# Patient Record
Sex: FEMALE | Race: WHITE | Employment: FULL TIME | URBAN - METROPOLITAN AREA
[De-identification: names, ages, dates, MRNs, and addresses within clinical notes are randomized per-mention and may not be internally consistent; named-entity substitution may affect disease eponyms.]

---

## 2017-03-15 ENCOUNTER — OFFICE VISIT (OUTPATIENT)
Dept: FAMILY MEDICINE CLINIC | Age: 57
End: 2017-03-15

## 2017-03-15 DIAGNOSIS — Z13.9 SCREENING: Primary | ICD-10-CM

## 2017-03-15 PROCEDURE — WM1004 WORKMED DRUG COLLECTION: Performed by: NURSE PRACTITIONER

## 2018-03-23 ENCOUNTER — HOSPITAL ENCOUNTER (EMERGENCY)
Age: 58
Discharge: HOME | End: 2018-03-23
Attending: EMERGENCY MEDICINE
Payer: COMMERCIAL

## 2018-03-23 ENCOUNTER — APPOINTMENT (OUTPATIENT)
Dept: CT IMAGING | Age: 58
End: 2018-03-23
Payer: COMMERCIAL

## 2018-03-23 ENCOUNTER — APPOINTMENT (OUTPATIENT)
Dept: GENERAL RADIOLOGY | Age: 58
End: 2018-03-23
Payer: COMMERCIAL

## 2018-03-23 VITALS
DIASTOLIC BLOOD PRESSURE: 61 MMHG | OXYGEN SATURATION: 95 % | RESPIRATION RATE: 16 BRPM | HEART RATE: 91 BPM | HEIGHT: 63 IN | BODY MASS INDEX: 37.76 KG/M2 | SYSTOLIC BLOOD PRESSURE: 155 MMHG | TEMPERATURE: 98.4 F | WEIGHT: 213.1 LBS

## 2018-03-23 DIAGNOSIS — J06.9 VIRAL URI: Primary | ICD-10-CM

## 2018-03-23 LAB
ABSOLUTE EOS #: 0.1 K/UL (ref 0–0.4)
ABSOLUTE IMMATURE GRANULOCYTE: ABNORMAL K/UL (ref 0–0.3)
ABSOLUTE LYMPH #: 2.5 K/UL (ref 1–4.8)
ABSOLUTE MONO #: 0.4 K/UL (ref 0.2–0.8)
ANION GAP SERPL CALCULATED.3IONS-SCNC: 13 MMOL/L (ref 9–17)
BASOPHILS # BLD: 0 % (ref 0–2)
BASOPHILS ABSOLUTE: 0 K/UL (ref 0–0.2)
BUN BLDV-MCNC: 18 MG/DL (ref 6–20)
BUN/CREAT BLD: 23 (ref 9–20)
CALCIUM SERPL-MCNC: 9.3 MG/DL (ref 8.6–10.4)
CHLORIDE BLD-SCNC: 103 MMOL/L (ref 98–107)
CO2: 23 MMOL/L (ref 20–31)
CREAT SERPL-MCNC: 0.79 MG/DL (ref 0.5–0.9)
DIFFERENTIAL TYPE: ABNORMAL
EOSINOPHILS RELATIVE PERCENT: 1 % (ref 1–4)
GFR AFRICAN AMERICAN: >60 ML/MIN
GFR NON-AFRICAN AMERICAN: >60 ML/MIN
GFR SERPL CREATININE-BSD FRML MDRD: ABNORMAL ML/MIN/{1.73_M2}
GFR SERPL CREATININE-BSD FRML MDRD: ABNORMAL ML/MIN/{1.73_M2}
GLUCOSE BLD-MCNC: 96 MG/DL (ref 70–99)
HCT VFR BLD CALC: 43.6 % (ref 36–46)
HEMOGLOBIN: 14.2 G/DL (ref 12–16)
IMMATURE GRANULOCYTES: ABNORMAL %
LACTIC ACID: 1.5 MMOL/L (ref 0.5–2.2)
LYMPHOCYTES # BLD: 22 % (ref 24–44)
MCH RBC QN AUTO: 28.1 PG (ref 26–34)
MCHC RBC AUTO-ENTMCNC: 32.5 G/DL (ref 31–37)
MCV RBC AUTO: 86.2 FL (ref 80–100)
MONOCYTES # BLD: 4 % (ref 1–7)
NRBC AUTOMATED: ABNORMAL PER 100 WBC
PDW BLD-RTO: 15.4 % (ref 11.5–14.5)
PLATELET # BLD: 209 K/UL (ref 130–400)
PLATELET ESTIMATE: ABNORMAL
PMV BLD AUTO: 9 FL (ref 6–12)
POTASSIUM SERPL-SCNC: 4.2 MMOL/L (ref 3.7–5.3)
RBC # BLD: 5.05 M/UL (ref 4–5.2)
RBC # BLD: ABNORMAL 10*6/UL
SEG NEUTROPHILS: 73 % (ref 36–66)
SEGMENTED NEUTROPHILS ABSOLUTE COUNT: 8.4 K/UL (ref 1.8–7.7)
SODIUM BLD-SCNC: 139 MMOL/L (ref 135–144)
WBC # BLD: 11.4 K/UL (ref 3.5–11)
WBC # BLD: ABNORMAL 10*3/UL

## 2018-03-23 PROCEDURE — 71250 CT THORAX DX C-: CPT

## 2018-03-23 PROCEDURE — 99284 EMERGENCY DEPT VISIT MOD MDM: CPT

## 2018-03-23 PROCEDURE — 96375 TX/PRO/DX INJ NEW DRUG ADDON: CPT

## 2018-03-23 PROCEDURE — 6360000002 HC RX W HCPCS: Performed by: NURSE PRACTITIONER

## 2018-03-23 PROCEDURE — 80048 BASIC METABOLIC PNL TOTAL CA: CPT

## 2018-03-23 PROCEDURE — 94150 VITAL CAPACITY TEST: CPT

## 2018-03-23 PROCEDURE — 71046 X-RAY EXAM CHEST 2 VIEWS: CPT

## 2018-03-23 PROCEDURE — 83605 ASSAY OF LACTIC ACID: CPT

## 2018-03-23 PROCEDURE — 94664 DEMO&/EVAL PT USE INHALER: CPT

## 2018-03-23 PROCEDURE — 74176 CT ABD & PELVIS W/O CONTRAST: CPT

## 2018-03-23 PROCEDURE — 85025 COMPLETE CBC W/AUTO DIFF WBC: CPT

## 2018-03-23 PROCEDURE — 94640 AIRWAY INHALATION TREATMENT: CPT

## 2018-03-23 PROCEDURE — 96374 THER/PROPH/DIAG INJ IV PUSH: CPT

## 2018-03-23 RX ORDER — LEVOFLOXACIN 500 MG/1
500 TABLET, FILM COATED ORAL DAILY
COMMUNITY
End: 2018-12-05 | Stop reason: ALTCHOICE

## 2018-03-23 RX ORDER — ECHINACEA PURPUREA EXTRACT 125 MG
1 TABLET ORAL PRN
Qty: 1 BOTTLE | Refills: 0 | Status: SHIPPED | OUTPATIENT
Start: 2018-03-23 | End: 2018-12-05 | Stop reason: ALTCHOICE

## 2018-03-23 RX ORDER — ALBUTEROL SULFATE 90 UG/1
2 AEROSOL, METERED RESPIRATORY (INHALATION)
Status: DISCONTINUED | OUTPATIENT
Start: 2018-03-23 | End: 2018-03-23 | Stop reason: HOSPADM

## 2018-03-23 RX ORDER — METHYLPREDNISOLONE SODIUM SUCCINATE 125 MG/2ML
125 INJECTION, POWDER, LYOPHILIZED, FOR SOLUTION INTRAMUSCULAR; INTRAVENOUS ONCE
Status: COMPLETED | OUTPATIENT
Start: 2018-03-23 | End: 2018-03-23

## 2018-03-23 RX ORDER — KETOROLAC TROMETHAMINE 30 MG/ML
30 INJECTION, SOLUTION INTRAMUSCULAR; INTRAVENOUS ONCE
Status: COMPLETED | OUTPATIENT
Start: 2018-03-23 | End: 2018-03-23

## 2018-03-23 RX ORDER — IPRATROPIUM BROMIDE AND ALBUTEROL SULFATE 2.5; .5 MG/3ML; MG/3ML
1 SOLUTION RESPIRATORY (INHALATION)
Status: DISCONTINUED | OUTPATIENT
Start: 2018-03-23 | End: 2018-03-23 | Stop reason: HOSPADM

## 2018-03-23 RX ORDER — ONDANSETRON 2 MG/ML
4 INJECTION INTRAMUSCULAR; INTRAVENOUS ONCE
Status: COMPLETED | OUTPATIENT
Start: 2018-03-23 | End: 2018-03-23

## 2018-03-23 RX ORDER — ALBUTEROL SULFATE 2.5 MG/3ML
5 SOLUTION RESPIRATORY (INHALATION)
Status: DISCONTINUED | OUTPATIENT
Start: 2018-03-23 | End: 2018-03-23 | Stop reason: HOSPADM

## 2018-03-23 RX ORDER — MORPHINE SULFATE 4 MG/ML
4 INJECTION, SOLUTION INTRAMUSCULAR; INTRAVENOUS ONCE
Status: COMPLETED | OUTPATIENT
Start: 2018-03-23 | End: 2018-03-23

## 2018-03-23 RX ORDER — IPRATROPIUM BROMIDE AND ALBUTEROL SULFATE 2.5; .5 MG/3ML; MG/3ML
1 SOLUTION RESPIRATORY (INHALATION) EVERY 6 HOURS
COMMUNITY
End: 2018-12-05 | Stop reason: ALTCHOICE

## 2018-03-23 RX ORDER — PREDNISONE 50 MG/1
50 TABLET ORAL DAILY
COMMUNITY
End: 2018-12-05 | Stop reason: ALTCHOICE

## 2018-03-23 RX ORDER — TRIAMCINOLONE ACETONIDE 55 UG/1
2 SPRAY, METERED NASAL DAILY
Qty: 1 INHALER | Refills: 0 | Status: SHIPPED | OUTPATIENT
Start: 2018-03-23 | End: 2018-12-05 | Stop reason: ALTCHOICE

## 2018-03-23 RX ORDER — FAMOTIDINE 20 MG/1
20 TABLET, FILM COATED ORAL 2 TIMES DAILY
Qty: 14 TABLET | Refills: 0 | Status: SHIPPED | OUTPATIENT
Start: 2018-03-23 | End: 2018-12-05 | Stop reason: ALTCHOICE

## 2018-03-23 RX ADMIN — MORPHINE SULFATE 4 MG: 4 INJECTION INTRAVENOUS at 13:26

## 2018-03-23 RX ADMIN — METHYLPREDNISOLONE SODIUM SUCCINATE 125 MG: 125 INJECTION, POWDER, FOR SOLUTION INTRAMUSCULAR; INTRAVENOUS at 11:38

## 2018-03-23 RX ADMIN — KETOROLAC TROMETHAMINE 30 MG: 30 INJECTION, SOLUTION INTRAMUSCULAR at 13:24

## 2018-03-23 RX ADMIN — ONDANSETRON 4 MG: 2 INJECTION INTRAMUSCULAR; INTRAVENOUS at 13:21

## 2018-03-23 RX ADMIN — ALBUTEROL SULFATE 2.5 MG: 5 SOLUTION RESPIRATORY (INHALATION) at 11:41

## 2018-03-23 RX ADMIN — ALBUTEROL SULFATE 2.5 MG: 5 SOLUTION RESPIRATORY (INHALATION) at 11:47

## 2018-03-23 ASSESSMENT — PAIN SCALES - GENERAL
PAINLEVEL_OUTOF10: 6

## 2018-03-23 ASSESSMENT — ENCOUNTER SYMPTOMS
BACK PAIN: 1
COUGH: 1
ABDOMINAL PAIN: 1
VOMITING: 0
NAUSEA: 0
DIARRHEA: 0
SINUS PRESSURE: 1
COLOR CHANGE: 0
RHINORRHEA: 1
SORE THROAT: 0
SHORTNESS OF BREATH: 0

## 2018-03-23 ASSESSMENT — PAIN SCALES - WONG BAKER: WONGBAKER_NUMERICALRESPONSE: 6

## 2018-03-23 ASSESSMENT — PAIN DESCRIPTION - LOCATION: LOCATION: ABDOMEN;CHEST

## 2018-03-23 ASSESSMENT — PAIN DESCRIPTION - DESCRIPTORS: DESCRIPTORS: TENDER

## 2018-03-23 ASSESSMENT — PAIN DESCRIPTION - FREQUENCY: FREQUENCY: INTERMITTENT

## 2018-03-23 NOTE — PROGRESS NOTES
Bronchodilator Assessment    FEV1 % PREDICTED 57%FEV1 actual: 1.5  PEFR  280  PEFR % Predicted 73%  RR 18  Bronchodilator assessment at level   BRONCHODILATOR ASSESSMENT SCORE  Score 1 2 3 4   Breath Sounds   []  Clear [x]  Mild Wheezing with good aeration []  Moderate I/E wheezing with adequate aeration []  Poor Aeration or diffuse wheezing   Respiratory Rate [x]  Less than 20 []  20-25 []  Greater than 25  []  Greater than 35    Dyspnea [x]  No SOB  []  SOB with minimal activity []  Speaking in partial sentences []  Acute/ At rest   Peakflow (asthma) []  80 % or greater predicted/PB  []  Unable [x]  70% or greater predicted/PB  []  Unable []  51%-70% predicted/PB  []  Unable []  Less than 50% predicted/PB  []  Unable due to distress   FEV1 % Predicted []  Greater than 69%  []  Unable  [x]  Less than 50%-69%  []  Unable  []  Less than 35%-49%  []  Unable  []  Less than 35%  []  Unable due to distress     MDI Instruction aerochamber instruct

## 2018-03-23 NOTE — LETTER
[facility] ED  [facility] [place] [address]  Phone: [phone]             March 23, 2018    Patient: Kirsty Whitaker   YOB: 1960   Date of Visit: 3/23/2018       To Whom It May Concern:    Kirsty Whitaker was seen and treated in our emergency department on 3/23/2018. She may return to work on 3/26/18.       Sincerely,             Signature:__________________________________

## 2018-03-23 NOTE — ED PROVIDER NOTES
lisinopril (PRINIVIL;ZESTRIL) 5 MG tablet daily Historical Med      levothyroxine (SYNTHROID) 200 MCG tablet 225 mcg daily Historical Med      furosemide (LASIX) 40 MG tablet 2 times daily Historical Med      clobetasol (TEMOVATE) 0.05 % external solution Historical Med      fluticasone (FLOVENT HFA) 220 MCG/ACT inhaler Inhale 2 puffs into the lungs 2 times daily, Disp-1 Inhaler, R-1      ketoconazole (NIZORAL) 2 % shampoo Historical Med             PAST MEDICAL HISTORY         Diagnosis Date    Allergic rhinitis     Anxiety     GERD (gastroesophageal reflux disease)     Hypertension     Hypothyroidism     Osteoarthritis     Skin disorder        SURGICAL HISTORY           Procedure Laterality Date    APPENDECTOMY      BUNIONECTOMY  1992    HYSTERECTOMY  1996    LAPAROSCOPY  1997         FAMILY HISTORY     History reviewed. No pertinent family history. No family status information on file. SOCIAL HISTORY      reports that she has been smoking Cigarettes. She has never used smokeless tobacco. She reports that she drinks alcohol. She reports that she does not use drugs. REVIEW OF SYSTEMS    (2-9 systems for level 4, 10 or more for level 5)     Review of Systems   Constitutional: Negative for chills, diaphoresis, fatigue and fever. HENT: Positive for congestion, postnasal drip, rhinorrhea, sinus pressure and sneezing. Negative for ear discharge, ear pain and sore throat. Respiratory: Positive for cough. Negative for shortness of breath. Cardiovascular: Negative for chest pain and palpitations. Gastrointestinal: Positive for abdominal pain. Negative for diarrhea, nausea and vomiting. Genitourinary: Negative for dysuria. Musculoskeletal: Positive for back pain and myalgias. Negative for arthralgias, neck pain and neck stiffness. Skin: Negative for color change and rash. Neurological: Negative for dizziness, weakness, light-headedness and headaches.      Except as noted above the remainder of the review of systems was reviewed and negative. PHYSICAL EXAM    (up to 7 for level 4, 8 or more for level 5)     ED Triage Vitals [03/23/18 1027]   BP Temp Temp Source Pulse Resp SpO2 Height Weight   (!) 155/75 97.6 °F (36.4 °C) Oral 77 16 95 % 5' 3\" (1.6 m) 213 lb 1.6 oz (96.7 kg)     Physical Exam   Constitutional: She is oriented to person, place, and time. She appears well-developed and well-nourished. No distress. HENT:   Right Ear: Tympanic membrane, external ear and ear canal normal.   Left Ear: Tympanic membrane, external ear and ear canal normal.   Mouth/Throat: Oropharynx is clear and moist.   Eyes: Conjunctivae are normal.   Neck: Normal range of motion. Neck supple. Cardiovascular: Normal rate, regular rhythm and normal heart sounds. Pulmonary/Chest: Effort normal and breath sounds normal. No respiratory distress. She has no wheezes. She has no rales. She exhibits no tenderness. Abdominal: Soft. Bowel sounds are normal. She exhibits no distension. There is no tenderness. There is no rigidity, no rebound, no guarding and no CVA tenderness. Musculoskeletal:        Thoracic back: She exhibits no tenderness, no bony tenderness and no pain. Lumbar back: She exhibits pain. She exhibits no tenderness and no bony tenderness. Neurological: She is alert and oriented to person, place, and time. Skin: Skin is warm and dry. No rash noted. She is not diaphoretic. Psychiatric: She has a normal mood and affect. Her behavior is normal.   Vitals reviewed.       DIAGNOSTIC RESULTS     RADIOLOGY:   Non-plain film images such as CT, Ultrasound and MRI are read by the radiologist. Colt Higginbotham radiographic images are visualized and preliminarily interpreted by the emergency physician with the below findings:    Interpretation per the Radiologist below, if available at the time of this note:    Ct Abdomen Pelvis Wo Contrast Additional Contrast? None    Result Date: 3/23/2018  EXAMINATION: CT SYSTEM PROVIDED HISTORY: PAIN, COUGH; ORDERING SYSTEM PROVIDED HISTORY: pain TECHNOLOGIST PROVIDED HISTORY: Additional Contrast?->None FINDINGS: Chest: Mediastinum: Visualized thyroid parenchyma appears unremarkable. Thoracic aorta appears normal in caliber with mild calcification. Pulmonary trunk appears nondilated. Heart appears normal in size without pericardial effusion. No evidence of mediastinal, hilar or axillary lymphadenopathy. The esophagus is grossly unremarkable. Lungs/pleura: No focal lung consolidation, pneumothorax or pleural effusion. Mild dependent atelectatic changes. Trachea and distal airways appear patent. No suspicious noncalcified lung nodule or mass. Soft Tissues/Bones: Visualized soft tissues surrounding the chest wall demonstrate no acute findings. Osseous structures demonstrate mild degenerative change. Abdomen/Pelvis: Organs: Suboptimal evaluation due to non use of IV contrast.  Diffuse hepatic steatosis. Gallbladder, spleen, pancreas and adrenal glands all appear unremarkable. Kidneys demonstrate no evidence of stone or hydronephrosis. Abdominal aorta demonstrates mild calcification without aneurysm. GI/Bowel: Stomach is grossly unremarkable. Small bowel appears nondilated. Sigmoid diverticulosis. Appendix is has been removed. Pelvis: Uterus has been removed. No adnexal mass or cyst.  Urinary bladder is grossly unremarkable. Peritoneum/Retroperitoneum: No free air, free fluid or lymphadenopathy. Bones/Soft Tissues: Abdominal wall demonstrates no acute findings. Osseous structures demonstrate mild degenerative change. *No acute cardiopulmonary or intra-abdominal process. *Hepatic steatosis. *Sigmoid diverticulosis.        LABS:  Labs Reviewed   BASIC METABOLIC PANEL - Abnormal; Notable for the following:        Result Value    Bun/Cre Ratio 23 (*)     All other components within normal limits   CBC WITH AUTO DIFFERENTIAL - Abnormal; Notable for the following:     WBC 11.4 (*)     RDW 15.4 (*)     Seg Neutrophils 73 (*)     Lymphocytes 22 (*)     Segs Absolute 8.40 (*)     All other components within normal limits   LACTIC ACID       All other labs were within normal range or not returned as of this dictation. EMERGENCY DEPARTMENT COURSE and DIFFERENTIAL DIAGNOSIS/MDM:   Vitals:    Vitals:    03/23/18 1027 03/23/18 1334   BP: (!) 155/75 (!) 155/61   Pulse: 77 91   Resp: 16 16   Temp: 97.6 °F (36.4 °C) 98.4 °F (36.9 °C)   TempSrc: Oral    SpO2: 95% 95%   Weight: 213 lb 1.6 oz (96.7 kg)    Height: 5' 3\" (1.6 m)        MEDICATIONS GIVEN IN THE ED:  Medications   methylPREDNISolone sodium (SOLU-MEDROL) injection 125 mg (125 mg Intravenous Given 3/23/18 1138)   ketorolac (TORADOL) injection 30 mg (30 mg Intravenous Given 3/23/18 1324)   morphine (PF) injection 4 mg (4 mg Intravenous Given 3/23/18 1326)   ondansetron (ZOFRAN) injection 4 mg (4 mg Intravenous Given 3/23/18 1321)       CLINICAL DECISION MAKING:  The patient presented alert with a nontoxic appearance and was seen in conjunction with Dr. Patricia Del Rosario. Laboratory studies were unremarkable. Influenza was negative. Imaging was negative for acute findings. Prescriptions were written for Nasacort, saline nasal spray, and Pepcid at the recommendation of the physician. She filled a prescription for robitussin-AC two days ago. Follow up with pcp, return to ED if condition worsens. FINAL IMPRESSION      1.  Viral URI            DISPOSITION/PLAN   DISPOSITION Decision To Discharge 03/23/2018 01:09:31 PM      PATIENT REFERRED TO:   Kiya Wilks MD  [address] [facility]  [address] [facility]  [phone]    Schedule an appointment as soon as possible for a visit in 2 days      [facility] ED  [address] [facility]  [phone]    If symptoms worsen      DISCHARGE MEDICATIONS:     Discharge Medication List as of 3/23/2018  1:15 PM      START taking these medications    Details   triamcinolone (NASACORT

## 2018-04-18 ENCOUNTER — HOSPITAL ENCOUNTER (EMERGENCY)
Facility: CLINIC | Age: 58
Discharge: HOME | End: 2018-04-19
Attending: EMERGENCY MEDICINE
Payer: COMMERCIAL

## 2018-04-18 DIAGNOSIS — R10.12 LEFT UPPER QUADRANT PAIN: Primary | ICD-10-CM

## 2018-04-18 PROCEDURE — 99284 EMERGENCY DEPT VISIT MOD MDM: CPT

## 2018-04-19 ENCOUNTER — APPOINTMENT (OUTPATIENT)
Dept: CT IMAGING | Facility: CLINIC | Age: 58
End: 2018-04-19
Payer: COMMERCIAL

## 2018-04-19 VITALS
TEMPERATURE: 98 F | SYSTOLIC BLOOD PRESSURE: 158 MMHG | HEIGHT: 63 IN | BODY MASS INDEX: 37.21 KG/M2 | WEIGHT: 210 LBS | DIASTOLIC BLOOD PRESSURE: 82 MMHG | HEART RATE: 96 BPM | RESPIRATION RATE: 16 BRPM | OXYGEN SATURATION: 99 %

## 2018-04-19 LAB
-: ABNORMAL
ABSOLUTE EOS #: 0.2 K/UL (ref 0–0.4)
ABSOLUTE IMMATURE GRANULOCYTE: ABNORMAL K/UL (ref 0–0.3)
ABSOLUTE LYMPH #: 3.1 K/UL (ref 1–4.8)
ABSOLUTE MONO #: 0.5 K/UL (ref 0.1–1.2)
ALBUMIN SERPL-MCNC: 4 G/DL (ref 3.5–5.2)
ALBUMIN/GLOBULIN RATIO: 1.6 (ref 1–2.5)
ALP BLD-CCNC: 69 U/L (ref 35–104)
ALT SERPL-CCNC: 27 U/L (ref 5–33)
AMORPHOUS: ABNORMAL
ANION GAP SERPL CALCULATED.3IONS-SCNC: 14 MMOL/L (ref 9–17)
AST SERPL-CCNC: 17 U/L
BACTERIA: ABNORMAL
BASOPHILS # BLD: 0 % (ref 0–2)
BASOPHILS ABSOLUTE: 0 K/UL (ref 0–0.2)
BILIRUB SERPL-MCNC: <0.1 MG/DL (ref 0.3–1.2)
BILIRUBIN URINE: NEGATIVE
BUN BLDV-MCNC: 13 MG/DL (ref 6–20)
BUN/CREAT BLD: ABNORMAL (ref 9–20)
CALCIUM SERPL-MCNC: 9.2 MG/DL (ref 8.6–10.4)
CASTS UA: ABNORMAL /LPF (ref 0–2)
CHLORIDE BLD-SCNC: 103 MMOL/L (ref 98–107)
CO2: 23 MMOL/L (ref 20–31)
COLOR: YELLOW
COMMENT UA: ABNORMAL
CREAT SERPL-MCNC: 0.7 MG/DL (ref 0.5–0.9)
CRYSTALS, UA: ABNORMAL /HPF
DIFFERENTIAL TYPE: ABNORMAL
EOSINOPHILS RELATIVE PERCENT: 2 % (ref 1–4)
EPITHELIAL CELLS UA: ABNORMAL /HPF (ref 0–5)
GFR AFRICAN AMERICAN: >60 ML/MIN
GFR NON-AFRICAN AMERICAN: >60 ML/MIN
GFR SERPL CREATININE-BSD FRML MDRD: ABNORMAL ML/MIN/{1.73_M2}
GFR SERPL CREATININE-BSD FRML MDRD: ABNORMAL ML/MIN/{1.73_M2}
GLUCOSE BLD-MCNC: 165 MG/DL (ref 70–99)
GLUCOSE URINE: NEGATIVE
HCT VFR BLD CALC: 40.1 % (ref 36–46)
HEMOGLOBIN: 13.3 G/DL (ref 12–16)
IMMATURE GRANULOCYTES: ABNORMAL %
KETONES, URINE: NEGATIVE
LACTIC ACID: 2 MMOL/L (ref 0.5–2.2)
LEUKOCYTE ESTERASE, URINE: NEGATIVE
LIPASE: 41 U/L (ref 13–60)
LYMPHOCYTES # BLD: 32 % (ref 24–44)
MCH RBC QN AUTO: 28.8 PG (ref 26–34)
MCHC RBC AUTO-ENTMCNC: 33.1 G/DL (ref 31–37)
MCV RBC AUTO: 86.9 FL (ref 80–100)
MONOCYTES # BLD: 5 % (ref 2–11)
MUCUS: ABNORMAL
NITRITE, URINE: NEGATIVE
NRBC AUTOMATED: ABNORMAL PER 100 WBC
OTHER OBSERVATIONS UA: ABNORMAL
PDW BLD-RTO: 15.8 % (ref 12.5–15.4)
PH UA: 6 (ref 5–8)
PLATELET # BLD: 192 K/UL (ref 140–450)
PLATELET ESTIMATE: ABNORMAL
PMV BLD AUTO: 9.2 FL (ref 6–12)
POTASSIUM SERPL-SCNC: 4 MMOL/L (ref 3.7–5.3)
PROTEIN UA: NEGATIVE
RBC # BLD: 4.62 M/UL (ref 4–5.2)
RBC # BLD: ABNORMAL 10*6/UL
RBC UA: ABNORMAL /HPF (ref 0–2)
RENAL EPITHELIAL, UA: ABNORMAL /HPF
SEG NEUTROPHILS: 61 % (ref 36–66)
SEGMENTED NEUTROPHILS ABSOLUTE COUNT: 5.9 K/UL (ref 1.8–7.7)
SODIUM BLD-SCNC: 140 MMOL/L (ref 135–144)
SPECIFIC GRAVITY UA: 1.01 (ref 1–1.03)
TOTAL PROTEIN: 6.5 G/DL (ref 6.4–8.3)
TRICHOMONAS: ABNORMAL
TURBIDITY: CLEAR
URINE HGB: ABNORMAL
UROBILINOGEN, URINE: NORMAL
WBC # BLD: 9.7 K/UL (ref 3.5–11)
WBC # BLD: ABNORMAL 10*3/UL
WBC UA: ABNORMAL /HPF (ref 0–5)
YEAST: ABNORMAL

## 2018-04-19 PROCEDURE — 80053 COMPREHEN METABOLIC PANEL: CPT

## 2018-04-19 PROCEDURE — 83690 ASSAY OF LIPASE: CPT

## 2018-04-19 PROCEDURE — 81001 URINALYSIS AUTO W/SCOPE: CPT

## 2018-04-19 PROCEDURE — 74176 CT ABD & PELVIS W/O CONTRAST: CPT

## 2018-04-19 PROCEDURE — 6360000002 HC RX W HCPCS: Performed by: EMERGENCY MEDICINE

## 2018-04-19 PROCEDURE — 83605 ASSAY OF LACTIC ACID: CPT

## 2018-04-19 PROCEDURE — 96374 THER/PROPH/DIAG INJ IV PUSH: CPT

## 2018-04-19 PROCEDURE — 36415 COLL VENOUS BLD VENIPUNCTURE: CPT

## 2018-04-19 PROCEDURE — 85025 COMPLETE CBC W/AUTO DIFF WBC: CPT

## 2018-04-19 RX ORDER — KETOROLAC TROMETHAMINE 30 MG/ML
30 INJECTION, SOLUTION INTRAMUSCULAR; INTRAVENOUS ONCE
Status: COMPLETED | OUTPATIENT
Start: 2018-04-19 | End: 2018-04-19

## 2018-04-19 RX ADMIN — KETOROLAC TROMETHAMINE 30 MG: 30 INJECTION INTRAMUSCULAR; INTRAVENOUS at 00:35

## 2018-04-19 ASSESSMENT — PAIN SCALES - WONG BAKER: WONGBAKER_NUMERICALRESPONSE: 0

## 2018-04-19 ASSESSMENT — PAIN DESCRIPTION - PAIN TYPE
TYPE: ACUTE PAIN
TYPE: ACUTE PAIN

## 2018-04-19 ASSESSMENT — PAIN DESCRIPTION - PROGRESSION
CLINICAL_PROGRESSION: GRADUALLY WORSENING
CLINICAL_PROGRESSION: GRADUALLY IMPROVING

## 2018-04-19 ASSESSMENT — PAIN SCALES - GENERAL
PAINLEVEL_OUTOF10: 8
PAINLEVEL_OUTOF10: 8
PAINLEVEL_OUTOF10: 5

## 2018-04-19 ASSESSMENT — PAIN DESCRIPTION - ONSET
ONSET: ON-GOING
ONSET: ON-GOING

## 2018-04-19 ASSESSMENT — PAIN DESCRIPTION - ORIENTATION
ORIENTATION: LEFT
ORIENTATION: LEFT

## 2018-04-19 ASSESSMENT — PAIN DESCRIPTION - LOCATION: LOCATION: ABDOMEN

## 2018-04-19 ASSESSMENT — PAIN DESCRIPTION - DESCRIPTORS
DESCRIPTORS: BURNING;SHARP
DESCRIPTORS: BURNING;SHARP

## 2018-04-19 ASSESSMENT — PAIN DESCRIPTION - FREQUENCY
FREQUENCY: CONTINUOUS
FREQUENCY: CONTINUOUS

## 2018-12-05 RX ORDER — VENLAFAXINE HYDROCHLORIDE 75 MG/1
75 CAPSULE, EXTENDED RELEASE ORAL DAILY
COMMUNITY

## 2018-12-05 RX ORDER — ISOSORBIDE MONONITRATE 30 MG/1
30 TABLET, EXTENDED RELEASE ORAL DAILY
Status: ON HOLD | COMMUNITY
End: 2018-12-11 | Stop reason: HOSPADM

## 2018-12-05 RX ORDER — FLUTICASONE PROPIONATE 0.05 %
CREAM (GRAM) TOPICAL 2 TIMES DAILY PRN
COMMUNITY
End: 2018-12-06

## 2018-12-05 RX ORDER — NITROGLYCERIN 0.4 MG/1
0.4 TABLET SUBLINGUAL EVERY 5 MIN PRN
COMMUNITY

## 2018-12-05 RX ORDER — SUMATRIPTAN 25 MG/1
25 TABLET, FILM COATED ORAL DAILY PRN
COMMUNITY

## 2018-12-05 RX ORDER — NICOTINE 21 MG/24HR
1 PATCH, TRANSDERMAL 24 HOURS TRANSDERMAL EVERY 24 HOURS
Status: ON HOLD | COMMUNITY
End: 2018-12-11 | Stop reason: HOSPADM

## 2018-12-05 RX ORDER — ASPIRIN 81 MG/1
81 TABLET ORAL DAILY
COMMUNITY

## 2018-12-05 RX ORDER — METOPROLOL SUCCINATE 25 MG/1
25 TABLET, EXTENDED RELEASE ORAL DAILY
Status: ON HOLD | COMMUNITY
End: 2018-12-11 | Stop reason: HOSPADM

## 2018-12-05 RX ORDER — LEVOTHYROXINE SODIUM 0.2 MG/1
200 TABLET ORAL DAILY
COMMUNITY

## 2018-12-06 ENCOUNTER — HOSPITAL ENCOUNTER (INPATIENT)
Dept: CARDIAC CATH/INVASIVE PROCEDURES | Age: 58
LOS: 4 days | Discharge: NURSING, REHAB OR LONG-TERM CARE | DRG: 234 | End: 2018-12-11
Attending: INTERNAL MEDICINE | Admitting: INTERNAL MEDICINE
Payer: COMMERCIAL

## 2018-12-06 DIAGNOSIS — Z95.1 S/P CABG (CORONARY ARTERY BYPASS GRAFT): ICD-10-CM

## 2018-12-06 DIAGNOSIS — I25.10 CAD IN NATIVE ARTERY: Primary | ICD-10-CM

## 2018-12-06 PROBLEM — R07.9 CHEST PAIN: Status: ACTIVE | Noted: 2018-12-06

## 2018-12-06 LAB
BILIRUBIN URINE: NEGATIVE
COLOR: YELLOW
COMMENT UA: ABNORMAL
EKG ATRIAL RATE: 102 BPM
EKG P AXIS: 37 DEGREES
EKG P-R INTERVAL: 138 MS
EKG Q-T INTERVAL: 362 MS
EKG QRS DURATION: 80 MS
EKG QTC CALCULATION (BAZETT): 471 MS
EKG R AXIS: 20 DEGREES
EKG T AXIS: 46 DEGREES
EKG VENTRICULAR RATE: 102 BPM
GLUCOSE URINE: NEGATIVE
HCT VFR BLD CALC: 39.9 % (ref 36–46)
HEMOGLOBIN: 13.6 G/DL (ref 12–16)
KETONES, URINE: NEGATIVE
LEUKOCYTE ESTERASE, URINE: NEGATIVE
LV EF: 65 %
LVEF MODALITY: NORMAL
MCH RBC QN AUTO: 27.9 PG (ref 26–34)
MCHC RBC AUTO-ENTMCNC: 34 G/DL (ref 31–37)
MCV RBC AUTO: 82.3 FL (ref 80–100)
NITRITE, URINE: NEGATIVE
NRBC AUTOMATED: ABNORMAL PER 100 WBC
PARTIAL THROMBOPLASTIN TIME: 27.1 SEC (ref 23–31)
PARTIAL THROMBOPLASTIN TIME: 35.2 SEC (ref 23–31)
PDW BLD-RTO: 14.6 % (ref 11.5–14.5)
PH UA: 8 (ref 5–8)
PLATELET # BLD: 195 K/UL (ref 130–400)
PMV BLD AUTO: 9.2 FL (ref 6–12)
PROTEIN UA: NEGATIVE
RBC # BLD: 4.85 M/UL (ref 4–5.2)
SPECIFIC GRAVITY UA: 1 (ref 1–1.03)
TURBIDITY: CLEAR
URINE HGB: NEGATIVE
UROBILINOGEN, URINE: NORMAL
WBC # BLD: 9.2 K/UL (ref 3.5–11)

## 2018-12-06 PROCEDURE — 81003 URINALYSIS AUTO W/O SCOPE: CPT

## 2018-12-06 PROCEDURE — S0028 INJECTION, FAMOTIDINE, 20 MG: HCPCS | Performed by: INTERNAL MEDICINE

## 2018-12-06 PROCEDURE — 2580000003 HC RX 258: Performed by: INTERNAL MEDICINE

## 2018-12-06 PROCEDURE — 87641 MR-STAPH DNA AMP PROBE: CPT

## 2018-12-06 PROCEDURE — 85027 COMPLETE CBC AUTOMATED: CPT

## 2018-12-06 PROCEDURE — 4A023N7 MEASUREMENT OF CARDIAC SAMPLING AND PRESSURE, LEFT HEART, PERCUTANEOUS APPROACH: ICD-10-PCS | Performed by: INTERNAL MEDICINE

## 2018-12-06 PROCEDURE — 36415 COLL VENOUS BLD VENIPUNCTURE: CPT

## 2018-12-06 PROCEDURE — 6360000002 HC RX W HCPCS

## 2018-12-06 PROCEDURE — 86900 BLOOD TYPING SEROLOGIC ABO: CPT

## 2018-12-06 PROCEDURE — B2151ZZ FLUOROSCOPY OF LEFT HEART USING LOW OSMOLAR CONTRAST: ICD-10-PCS | Performed by: INTERNAL MEDICINE

## 2018-12-06 PROCEDURE — 99253 IP/OBS CNSLTJ NEW/EST LOW 45: CPT | Performed by: THORACIC SURGERY (CARDIOTHORACIC VASCULAR SURGERY)

## 2018-12-06 PROCEDURE — 86850 RBC ANTIBODY SCREEN: CPT

## 2018-12-06 PROCEDURE — 2500000003 HC RX 250 WO HCPCS: Performed by: INTERNAL MEDICINE

## 2018-12-06 PROCEDURE — 2580000003 HC RX 258

## 2018-12-06 PROCEDURE — 6360000002 HC RX W HCPCS: Performed by: NURSE PRACTITIONER

## 2018-12-06 PROCEDURE — 93970 EXTREMITY STUDY: CPT

## 2018-12-06 PROCEDURE — 86920 COMPATIBILITY TEST SPIN: CPT

## 2018-12-06 PROCEDURE — 93458 L HRT ARTERY/VENTRICLE ANGIO: CPT | Performed by: INTERNAL MEDICINE

## 2018-12-06 PROCEDURE — 6360000002 HC RX W HCPCS: Performed by: INTERNAL MEDICINE

## 2018-12-06 PROCEDURE — 6370000000 HC RX 637 (ALT 250 FOR IP): Performed by: INTERNAL MEDICINE

## 2018-12-06 PROCEDURE — 2500000003 HC RX 250 WO HCPCS

## 2018-12-06 PROCEDURE — 93880 EXTRACRANIAL BILAT STUDY: CPT

## 2018-12-06 PROCEDURE — 86901 BLOOD TYPING SEROLOGIC RH(D): CPT

## 2018-12-06 PROCEDURE — 6370000000 HC RX 637 (ALT 250 FOR IP): Performed by: THORACIC SURGERY (CARDIOTHORACIC VASCULAR SURGERY)

## 2018-12-06 PROCEDURE — G0365 VESSEL MAPPING HEMO ACCESS: HCPCS

## 2018-12-06 PROCEDURE — 6360000004 HC RX CONTRAST MEDICATION

## 2018-12-06 PROCEDURE — 85730 THROMBOPLASTIN TIME PARTIAL: CPT

## 2018-12-06 PROCEDURE — 93005 ELECTROCARDIOGRAM TRACING: CPT

## 2018-12-06 PROCEDURE — 94010 BREATHING CAPACITY TEST: CPT

## 2018-12-06 PROCEDURE — B2111ZZ FLUOROSCOPY OF MULTIPLE CORONARY ARTERIES USING LOW OSMOLAR CONTRAST: ICD-10-PCS | Performed by: INTERNAL MEDICINE

## 2018-12-06 PROCEDURE — 93306 TTE W/DOPPLER COMPLETE: CPT

## 2018-12-06 RX ORDER — ASPIRIN 81 MG/1
81 TABLET ORAL DAILY
Status: DISCONTINUED | OUTPATIENT
Start: 2018-12-06 | End: 2018-12-07

## 2018-12-06 RX ORDER — LEVOTHYROXINE SODIUM 0.2 MG/1
200 TABLET ORAL DAILY
Status: DISCONTINUED | OUTPATIENT
Start: 2018-12-06 | End: 2018-12-07

## 2018-12-06 RX ORDER — ASPIRIN 81 MG/1
81 TABLET, CHEWABLE ORAL ONCE
Status: COMPLETED | OUTPATIENT
Start: 2018-12-06 | End: 2018-12-06

## 2018-12-06 RX ORDER — SODIUM CHLORIDE 9 MG/ML
INJECTION, SOLUTION INTRAVENOUS CONTINUOUS
Status: DISCONTINUED | OUTPATIENT
Start: 2018-12-06 | End: 2018-12-06 | Stop reason: SDUPTHER

## 2018-12-06 RX ORDER — NITROGLYCERIN 20 MG/100ML
5 INJECTION INTRAVENOUS CONTINUOUS PRN
Status: DISCONTINUED | OUTPATIENT
Start: 2018-12-06 | End: 2018-12-07

## 2018-12-06 RX ORDER — ACETAMINOPHEN 325 MG/1
650 TABLET ORAL EVERY 4 HOURS PRN
Status: DISCONTINUED | OUTPATIENT
Start: 2018-12-06 | End: 2018-12-07

## 2018-12-06 RX ORDER — ISOSORBIDE MONONITRATE 30 MG/1
30 TABLET, EXTENDED RELEASE ORAL DAILY
Status: DISCONTINUED | OUTPATIENT
Start: 2018-12-06 | End: 2018-12-07

## 2018-12-06 RX ORDER — HEPARIN SODIUM 10000 [USP'U]/100ML
1000 INJECTION, SOLUTION INTRAVENOUS CONTINUOUS
Status: DISCONTINUED | OUTPATIENT
Start: 2018-12-06 | End: 2018-12-07

## 2018-12-06 RX ORDER — HEPARIN SODIUM 1000 [USP'U]/ML
4000 INJECTION, SOLUTION INTRAVENOUS; SUBCUTANEOUS ONCE
Status: COMPLETED | OUTPATIENT
Start: 2018-12-06 | End: 2018-12-06

## 2018-12-06 RX ORDER — SUMATRIPTAN 25 MG/1
25 TABLET, FILM COATED ORAL DAILY PRN
Status: DISCONTINUED | OUTPATIENT
Start: 2018-12-06 | End: 2018-12-07

## 2018-12-06 RX ORDER — SODIUM CHLORIDE 0.9 % (FLUSH) 0.9 %
10 SYRINGE (ML) INJECTION PRN
Status: DISCONTINUED | OUTPATIENT
Start: 2018-12-06 | End: 2018-12-07

## 2018-12-06 RX ORDER — METHYLPREDNISOLONE SODIUM SUCCINATE 125 MG/2ML
125 INJECTION, POWDER, LYOPHILIZED, FOR SOLUTION INTRAMUSCULAR; INTRAVENOUS ONCE
Status: COMPLETED | OUTPATIENT
Start: 2018-12-06 | End: 2018-12-06

## 2018-12-06 RX ORDER — SODIUM CHLORIDE 0.9 % (FLUSH) 0.9 %
10 SYRINGE (ML) INJECTION EVERY 12 HOURS SCHEDULED
Status: DISCONTINUED | OUTPATIENT
Start: 2018-12-06 | End: 2018-12-07

## 2018-12-06 RX ORDER — METOPROLOL TARTRATE 5 MG/5ML
5 INJECTION INTRAVENOUS EVERY 4 HOURS PRN
Status: DISCONTINUED | OUTPATIENT
Start: 2018-12-06 | End: 2018-12-07

## 2018-12-06 RX ORDER — DIPHENHYDRAMINE HYDROCHLORIDE 50 MG/ML
50 INJECTION INTRAMUSCULAR; INTRAVENOUS ONCE
Status: COMPLETED | OUTPATIENT
Start: 2018-12-06 | End: 2018-12-06

## 2018-12-06 RX ORDER — HEPARIN SODIUM 1000 [USP'U]/ML
4000 INJECTION, SOLUTION INTRAVENOUS; SUBCUTANEOUS PRN
Status: DISCONTINUED | OUTPATIENT
Start: 2018-12-06 | End: 2018-12-07

## 2018-12-06 RX ORDER — SODIUM CHLORIDE 9 MG/ML
INJECTION, SOLUTION INTRAVENOUS CONTINUOUS
Status: DISCONTINUED | OUTPATIENT
Start: 2018-12-06 | End: 2018-12-07

## 2018-12-06 RX ORDER — PANTOPRAZOLE SODIUM 40 MG/1
40 TABLET, DELAYED RELEASE ORAL
Status: DISCONTINUED | OUTPATIENT
Start: 2018-12-07 | End: 2018-12-07

## 2018-12-06 RX ORDER — NITROGLYCERIN 0.4 MG/1
0.4 TABLET SUBLINGUAL EVERY 5 MIN PRN
Status: DISCONTINUED | OUTPATIENT
Start: 2018-12-06 | End: 2018-12-07

## 2018-12-06 RX ORDER — VENLAFAXINE HYDROCHLORIDE 75 MG/1
75 CAPSULE, EXTENDED RELEASE ORAL DAILY
Status: DISCONTINUED | OUTPATIENT
Start: 2018-12-06 | End: 2018-12-07

## 2018-12-06 RX ORDER — CEFAZOLIN SODIUM 1 G/50ML
1 INJECTION, SOLUTION INTRAVENOUS ONCE
Status: CANCELLED | OUTPATIENT
Start: 2018-12-06 | End: 2018-12-06

## 2018-12-06 RX ORDER — LISINOPRIL 5 MG/1
5 TABLET ORAL DAILY
Status: DISCONTINUED | OUTPATIENT
Start: 2018-12-06 | End: 2018-12-06

## 2018-12-06 RX ORDER — NICOTINE 21 MG/24HR
1 PATCH, TRANSDERMAL 24 HOURS TRANSDERMAL EVERY 24 HOURS
Status: DISCONTINUED | OUTPATIENT
Start: 2018-12-06 | End: 2018-12-06

## 2018-12-06 RX ORDER — METOPROLOL SUCCINATE 25 MG/1
25 TABLET, EXTENDED RELEASE ORAL DAILY
Status: DISCONTINUED | OUTPATIENT
Start: 2018-12-06 | End: 2018-12-06

## 2018-12-06 RX ORDER — HEPARIN SODIUM 1000 [USP'U]/ML
2000 INJECTION, SOLUTION INTRAVENOUS; SUBCUTANEOUS PRN
Status: DISCONTINUED | OUTPATIENT
Start: 2018-12-06 | End: 2018-12-07

## 2018-12-06 RX ADMIN — NITROGLYCERIN 0.4 MG: 0.4 TABLET, ORALLY DISINTEGRATING SUBLINGUAL at 22:30

## 2018-12-06 RX ADMIN — METOPROLOL TARTRATE 25 MG: 25 TABLET ORAL at 23:04

## 2018-12-06 RX ADMIN — DIPHENHYDRAMINE HYDROCHLORIDE 50 MG: 50 INJECTION, SOLUTION INTRAMUSCULAR; INTRAVENOUS at 12:48

## 2018-12-06 RX ADMIN — METHYLPREDNISOLONE SODIUM SUCCINATE 125 MG: 125 INJECTION, POWDER, FOR SOLUTION INTRAMUSCULAR; INTRAVENOUS at 12:48

## 2018-12-06 RX ADMIN — HEPARIN SODIUM 2000 UNITS: 1000 INJECTION INTRAVENOUS; SUBCUTANEOUS at 22:50

## 2018-12-06 RX ADMIN — ASPIRIN 81 MG: 81 TABLET, CHEWABLE ORAL at 12:47

## 2018-12-06 RX ADMIN — HEPARIN SODIUM AND DEXTROSE 1000 UNITS/HR: 10000; 5 INJECTION INTRAVENOUS at 16:53

## 2018-12-06 RX ADMIN — SODIUM CHLORIDE: 9 INJECTION, SOLUTION INTRAVENOUS at 14:10

## 2018-12-06 RX ADMIN — VENLAFAXINE HYDROCHLORIDE 75 MG: 75 CAPSULE, EXTENDED RELEASE ORAL at 23:04

## 2018-12-06 RX ADMIN — HEPARIN SODIUM 4000 UNITS: 1000 INJECTION INTRAVENOUS; SUBCUTANEOUS at 16:52

## 2018-12-06 RX ADMIN — SODIUM CHLORIDE: 9 INJECTION, SOLUTION INTRAVENOUS at 12:47

## 2018-12-06 RX ADMIN — FAMOTIDINE 20 MG: 10 INJECTION, SOLUTION INTRAVENOUS at 12:56

## 2018-12-06 ASSESSMENT — PAIN - FUNCTIONAL ASSESSMENT: PAIN_FUNCTIONAL_ASSESSMENT: 0-10

## 2018-12-06 ASSESSMENT — PAIN DESCRIPTION - FREQUENCY: FREQUENCY: INTERMITTENT

## 2018-12-06 ASSESSMENT — PAIN DESCRIPTION - DIRECTION: RADIATING_TOWARDS: ARMPIT

## 2018-12-06 ASSESSMENT — PAIN DESCRIPTION - LOCATION: LOCATION: CHEST

## 2018-12-06 ASSESSMENT — PAIN SCALES - GENERAL
PAINLEVEL_OUTOF10: 0
PAINLEVEL_OUTOF10: 2
PAINLEVEL_OUTOF10: 8

## 2018-12-06 ASSESSMENT — PAIN DESCRIPTION - ONSET: ONSET: SUDDEN

## 2018-12-06 ASSESSMENT — PAIN DESCRIPTION - PAIN TYPE: TYPE: ACUTE PAIN

## 2018-12-06 ASSESSMENT — PAIN DESCRIPTION - DESCRIPTORS: DESCRIPTORS: SHARP;BURNING;PINS AND NEEDLES

## 2018-12-06 ASSESSMENT — PAIN DESCRIPTION - ORIENTATION: ORIENTATION: LEFT

## 2018-12-06 NOTE — CONSULTS
TRIG  TSH: No results found for: TSH  UA:   Lab Results   Component Value Date    NITRITE neg 04/13/2016    COLORU YELLOW 04/19/2018    PHUR 6.0 04/19/2018    WBCUA 0 TO 2 04/19/2018    RBCUA 2 TO 5 04/19/2018    MUCUS NOT REPORTED 04/19/2018    TRICHOMONAS NOT REPORTED 04/19/2018    YEAST NOT REPORTED 04/19/2018    BACTERIA None 04/19/2018    CLARITYU yellow 04/13/2016    SPECGRAV 1.010 04/19/2018    LEUKOCYTESUR NEGATIVE 04/19/2018    UROBILINOGEN Normal 04/19/2018    BILIRUBINUR NEGATIVE 04/19/2018    BILIRUBINUR neg 04/13/2016    BLOODU trace 04/13/2016    GLUCOSEU NEGATIVE 04/19/2018    AMORPHOUS NOT REPORTED 04/19/2018         Testing:  Cardiac cath: TODAY  LAD:       Lesion on Mid LAD: Proximal subsection. 99% stenosis 15 mm length. Pre    procedure CHRIS I flow was noted. Good runoff was present. The lesion was    diagnosed as Moderate Risk (B). The lesion was eccentric. The lesion    showed evidence of thrombus presence, with irregular contour, mild    angulation and mild tortuosity.     LCx: Mild irregularities 20-30%. RCA:       Lesion on Prox RCA: Ostial.99% stenosis 8 mm length. Pre procedure CHRIS    II flow was noted. Good runoff was present. The lesion was diagnosed as    High Risk (C). Echo: PENDING AT [facility] [address]    CXR:/CT scan: 3/23  FINDINGS:   Chest:       Mediastinum: Visualized thyroid parenchyma appears unremarkable.  Thoracic   aorta appears normal in caliber with mild calcification.  Pulmonary trunk   appears nondilated.  Heart appears normal in size without pericardial   effusion.  No evidence of mediastinal, hilar or axillary lymphadenopathy. The esophagus is grossly unremarkable.       Lungs/pleura: No focal lung consolidation, pneumothorax or pleural effusion.    Mild dependent atelectatic changes.  Trachea and distal airways appear   patent.  No suspicious noncalcified lung nodule or mass.       Soft Tissues/Bones: Visualized soft tissues

## 2018-12-06 NOTE — FLOWSHEET NOTE
Dr. Ivana Montoya arrived to the patient's bedside and was updated on the patient's current status via RN. Dr. Ivana Montoya reviewed cardiac cath results, as well as discussed the risks and benefits of OHS. Patient verbalizes understanding and is agreeable to OHS tomorrow at 1300 with Dr. Ivana Montoya. Orders received for pre-op testing. Will continue to monitor closely.

## 2018-12-07 ENCOUNTER — APPOINTMENT (OUTPATIENT)
Dept: GENERAL RADIOLOGY | Age: 58
DRG: 234 | End: 2018-12-07
Attending: INTERNAL MEDICINE
Payer: COMMERCIAL

## 2018-12-07 ENCOUNTER — ANESTHESIA EVENT (OUTPATIENT)
Dept: OPERATING ROOM | Age: 58
DRG: 234 | End: 2018-12-07
Payer: COMMERCIAL

## 2018-12-07 ENCOUNTER — ANESTHESIA (OUTPATIENT)
Dept: OPERATING ROOM | Age: 58
DRG: 234 | End: 2018-12-07
Payer: COMMERCIAL

## 2018-12-07 VITALS
RESPIRATION RATE: 12 BRPM | SYSTOLIC BLOOD PRESSURE: 126 MMHG | DIASTOLIC BLOOD PRESSURE: 68 MMHG | OXYGEN SATURATION: 100 %

## 2018-12-07 PROBLEM — I25.10 CAD IN NATIVE ARTERY: Status: ACTIVE | Noted: 2018-12-07

## 2018-12-07 LAB
ANION GAP SERPL CALCULATED.3IONS-SCNC: 11 MMOL/L (ref 9–17)
ANION GAP SERPL CALCULATED.3IONS-SCNC: 12 MMOL/L (ref 9–17)
BUN BLDV-MCNC: 14 MG/DL (ref 6–20)
BUN BLDV-MCNC: 15 MG/DL (ref 6–20)
BUN/CREAT BLD: 19 (ref 9–20)
BUN/CREAT BLD: 22 (ref 9–20)
CALCIUM SERPL-MCNC: 8.5 MG/DL (ref 8.6–10.4)
CALCIUM SERPL-MCNC: 8.8 MG/DL (ref 8.6–10.4)
CHLORIDE BLD-SCNC: 102 MMOL/L (ref 98–107)
CHLORIDE BLD-SCNC: 106 MMOL/L (ref 98–107)
CO2: 21 MMOL/L (ref 20–31)
CO2: 23 MMOL/L (ref 20–31)
CREAT SERPL-MCNC: 0.69 MG/DL (ref 0.5–0.9)
CREAT SERPL-MCNC: 0.74 MG/DL (ref 0.5–0.9)
GFR AFRICAN AMERICAN: >60 ML/MIN
GFR AFRICAN AMERICAN: >60 ML/MIN
GFR NON-AFRICAN AMERICAN: >60 ML/MIN
GFR NON-AFRICAN AMERICAN: >60 ML/MIN
GFR SERPL CREATININE-BSD FRML MDRD: ABNORMAL ML/MIN/{1.73_M2}
GLUCOSE BLD-MCNC: 134 MG/DL (ref 70–99)
GLUCOSE BLD-MCNC: 140 MG/DL (ref 70–99)
HCT VFR BLD CALC: 34.6 % (ref 36–46)
HCT VFR BLD CALC: 38 % (ref 36–46)
HEMOGLOBIN: 11.8 G/DL (ref 12–16)
HEMOGLOBIN: 12.9 G/DL (ref 12–16)
INR BLD: 1.2
INR BLD: 1.3
MAGNESIUM: 3.1 MG/DL (ref 1.6–2.6)
MCH RBC QN AUTO: 28 PG (ref 26–34)
MCH RBC QN AUTO: 28.2 PG (ref 26–34)
MCHC RBC AUTO-ENTMCNC: 33.9 G/DL (ref 31–37)
MCHC RBC AUTO-ENTMCNC: 34.2 G/DL (ref 31–37)
MCV RBC AUTO: 82.4 FL (ref 80–100)
MCV RBC AUTO: 82.5 FL (ref 80–100)
MRSA, DNA, NASAL: NORMAL
NRBC AUTOMATED: ABNORMAL PER 100 WBC
NRBC AUTOMATED: ABNORMAL PER 100 WBC
PARTIAL THROMBOPLASTIN TIME: 21 SEC (ref 23–31)
PARTIAL THROMBOPLASTIN TIME: 23.8 SEC (ref 23–31)
PARTIAL THROMBOPLASTIN TIME: 43.6 SEC (ref 23–31)
PDW BLD-RTO: 14.3 % (ref 11.5–14.5)
PDW BLD-RTO: 14.8 % (ref 11.5–14.5)
PLATELET # BLD: 154 K/UL (ref 130–400)
PLATELET # BLD: 186 K/UL (ref 130–400)
PLATELET # BLD: 210 K/UL (ref 130–400)
PMV BLD AUTO: 9.5 FL (ref 6–12)
PMV BLD AUTO: 9.7 FL (ref 6–12)
POTASSIUM SERPL-SCNC: 4.2 MMOL/L (ref 3.7–5.3)
POTASSIUM SERPL-SCNC: 4.5 MMOL/L (ref 3.7–5.3)
PROTHROMBIN TIME: 11.9 SEC (ref 9.7–11.6)
PROTHROMBIN TIME: 13.3 SEC (ref 9.7–11.6)
RBC # BLD: 4.19 M/UL (ref 4–5.2)
RBC # BLD: 4.62 M/UL (ref 4–5.2)
SODIUM BLD-SCNC: 136 MMOL/L (ref 135–144)
SODIUM BLD-SCNC: 139 MMOL/L (ref 135–144)
SPECIMEN DESCRIPTION: NORMAL
WBC # BLD: 12.6 K/UL (ref 3.5–11)
WBC # BLD: 25.6 K/UL (ref 3.5–11)

## 2018-12-07 PROCEDURE — 82805 BLOOD GASES W/O2 SATURATION: CPT

## 2018-12-07 PROCEDURE — 33517 CABG ARTERY-VEIN SINGLE: CPT | Performed by: THORACIC SURGERY (CARDIOTHORACIC VASCULAR SURGERY)

## 2018-12-07 PROCEDURE — 2500000003 HC RX 250 WO HCPCS: Performed by: PHYSICIAN ASSISTANT

## 2018-12-07 PROCEDURE — 93005 ELECTROCARDIOGRAM TRACING: CPT

## 2018-12-07 PROCEDURE — 06BP4ZZ EXCISION OF RIGHT SAPHENOUS VEIN, PERCUTANEOUS ENDOSCOPIC APPROACH: ICD-10-PCS | Performed by: THORACIC SURGERY (CARDIOTHORACIC VASCULAR SURGERY)

## 2018-12-07 PROCEDURE — 85027 COMPLETE CBC AUTOMATED: CPT

## 2018-12-07 PROCEDURE — 94002 VENT MGMT INPAT INIT DAY: CPT

## 2018-12-07 PROCEDURE — 82330 ASSAY OF CALCIUM: CPT

## 2018-12-07 PROCEDURE — 82803 BLOOD GASES ANY COMBINATION: CPT

## 2018-12-07 PROCEDURE — 85049 AUTOMATED PLATELET COUNT: CPT

## 2018-12-07 PROCEDURE — C1751 CATH, INF, PER/CENT/MIDLINE: HCPCS | Performed by: THORACIC SURGERY (CARDIOTHORACIC VASCULAR SURGERY)

## 2018-12-07 PROCEDURE — 2500000003 HC RX 250 WO HCPCS: Performed by: NURSE ANESTHETIST, CERTIFIED REGISTERED

## 2018-12-07 PROCEDURE — 36415 COLL VENOUS BLD VENIPUNCTURE: CPT

## 2018-12-07 PROCEDURE — 0210093 BYPASS CORONARY ARTERY, ONE ARTERY FROM CORONARY ARTERY WITH AUTOLOGOUS VENOUS TISSUE, OPEN APPROACH: ICD-10-PCS | Performed by: THORACIC SURGERY (CARDIOTHORACIC VASCULAR SURGERY)

## 2018-12-07 PROCEDURE — B246ZZ4 ULTRASONOGRAPHY OF RIGHT AND LEFT HEART, TRANSESOPHAGEAL: ICD-10-PCS | Performed by: THORACIC SURGERY (CARDIOTHORACIC VASCULAR SURGERY)

## 2018-12-07 PROCEDURE — 6370000000 HC RX 637 (ALT 250 FOR IP): Performed by: THORACIC SURGERY (CARDIOTHORACIC VASCULAR SURGERY)

## 2018-12-07 PROCEDURE — 82800 BLOOD PH: CPT

## 2018-12-07 PROCEDURE — P9041 ALBUMIN (HUMAN),5%, 50ML: HCPCS | Performed by: PHYSICIAN ASSISTANT

## 2018-12-07 PROCEDURE — 6360000002 HC RX W HCPCS: Performed by: NURSE ANESTHETIST, CERTIFIED REGISTERED

## 2018-12-07 PROCEDURE — 2580000003 HC RX 258: Performed by: THORACIC SURGERY (CARDIOTHORACIC VASCULAR SURGERY)

## 2018-12-07 PROCEDURE — 6360000002 HC RX W HCPCS: Performed by: NURSE PRACTITIONER

## 2018-12-07 PROCEDURE — 33508 ENDOSCOPIC VEIN HARVEST: CPT | Performed by: THORACIC SURGERY (CARDIOTHORACIC VASCULAR SURGERY)

## 2018-12-07 PROCEDURE — 82947 ASSAY GLUCOSE BLOOD QUANT: CPT

## 2018-12-07 PROCEDURE — 2100000000 HC CCU R&B

## 2018-12-07 PROCEDURE — 2000000000 HC ICU R&B

## 2018-12-07 PROCEDURE — 85610 PROTHROMBIN TIME: CPT

## 2018-12-07 PROCEDURE — 6370000000 HC RX 637 (ALT 250 FOR IP): Performed by: INTERNAL MEDICINE

## 2018-12-07 PROCEDURE — 2500000003 HC RX 250 WO HCPCS: Performed by: THORACIC SURGERY (CARDIOTHORACIC VASCULAR SURGERY)

## 2018-12-07 PROCEDURE — 3600000018 HC SURGERY OHS ADDTL 15MIN: Performed by: THORACIC SURGERY (CARDIOTHORACIC VASCULAR SURGERY)

## 2018-12-07 PROCEDURE — 84132 ASSAY OF SERUM POTASSIUM: CPT

## 2018-12-07 PROCEDURE — C1894 INTRO/SHEATH, NON-LASER: HCPCS

## 2018-12-07 PROCEDURE — 5A1221Z PERFORMANCE OF CARDIAC OUTPUT, CONTINUOUS: ICD-10-PCS | Performed by: THORACIC SURGERY (CARDIOTHORACIC VASCULAR SURGERY)

## 2018-12-07 PROCEDURE — 3700000000 HC ANESTHESIA ATTENDED CARE: Performed by: THORACIC SURGERY (CARDIOTHORACIC VASCULAR SURGERY)

## 2018-12-07 PROCEDURE — 6360000002 HC RX W HCPCS

## 2018-12-07 PROCEDURE — 6370000000 HC RX 637 (ALT 250 FOR IP): Performed by: PHYSICIAN ASSISTANT

## 2018-12-07 PROCEDURE — 6360000002 HC RX W HCPCS: Performed by: PHYSICIAN ASSISTANT

## 2018-12-07 PROCEDURE — 84295 ASSAY OF SERUM SODIUM: CPT

## 2018-12-07 PROCEDURE — 85014 HEMATOCRIT: CPT

## 2018-12-07 PROCEDURE — 2580000003 HC RX 258: Performed by: NURSE ANESTHETIST, CERTIFIED REGISTERED

## 2018-12-07 PROCEDURE — P9041 ALBUMIN (HUMAN),5%, 50ML: HCPCS

## 2018-12-07 PROCEDURE — 6360000002 HC RX W HCPCS: Performed by: THORACIC SURGERY (CARDIOTHORACIC VASCULAR SURGERY)

## 2018-12-07 PROCEDURE — C1894 INTRO/SHEATH, NON-LASER: HCPCS | Performed by: THORACIC SURGERY (CARDIOTHORACIC VASCULAR SURGERY)

## 2018-12-07 PROCEDURE — 2709999900 HC NON-CHARGEABLE SUPPLY

## 2018-12-07 PROCEDURE — 80048 BASIC METABOLIC PNL TOTAL CA: CPT

## 2018-12-07 PROCEDURE — 83735 ASSAY OF MAGNESIUM: CPT

## 2018-12-07 PROCEDURE — 3600000008 HC SURGERY OHS BASE: Performed by: THORACIC SURGERY (CARDIOTHORACIC VASCULAR SURGERY)

## 2018-12-07 PROCEDURE — 02100Z9 BYPASS CORONARY ARTERY, ONE ARTERY FROM LEFT INTERNAL MAMMARY, OPEN APPROACH: ICD-10-PCS | Performed by: THORACIC SURGERY (CARDIOTHORACIC VASCULAR SURGERY)

## 2018-12-07 PROCEDURE — 71045 X-RAY EXAM CHEST 1 VIEW: CPT

## 2018-12-07 PROCEDURE — 36600 WITHDRAWAL OF ARTERIAL BLOOD: CPT

## 2018-12-07 PROCEDURE — 85730 THROMBOPLASTIN TIME PARTIAL: CPT

## 2018-12-07 PROCEDURE — S0028 INJECTION, FAMOTIDINE, 20 MG: HCPCS | Performed by: PHYSICIAN ASSISTANT

## 2018-12-07 PROCEDURE — 33533 CABG ARTERIAL SINGLE: CPT | Performed by: THORACIC SURGERY (CARDIOTHORACIC VASCULAR SURGERY)

## 2018-12-07 PROCEDURE — 2709999900 HC NON-CHARGEABLE SUPPLY: Performed by: THORACIC SURGERY (CARDIOTHORACIC VASCULAR SURGERY)

## 2018-12-07 PROCEDURE — 2720000010 HC SURG SUPPLY STERILE: Performed by: THORACIC SURGERY (CARDIOTHORACIC VASCULAR SURGERY)

## 2018-12-07 PROCEDURE — 3700000001 HC ADD 15 MINUTES (ANESTHESIA): Performed by: THORACIC SURGERY (CARDIOTHORACIC VASCULAR SURGERY)

## 2018-12-07 PROCEDURE — 2580000003 HC RX 258: Performed by: PHYSICIAN ASSISTANT

## 2018-12-07 RX ORDER — ONDANSETRON 2 MG/ML
4 INJECTION INTRAMUSCULAR; INTRAVENOUS EVERY 8 HOURS PRN
Status: DISCONTINUED | OUTPATIENT
Start: 2018-12-07 | End: 2018-12-07 | Stop reason: SDUPTHER

## 2018-12-07 RX ORDER — PROTAMINE SULFATE 10 MG/ML
INJECTION, SOLUTION INTRAVENOUS PRN
Status: DISCONTINUED | OUTPATIENT
Start: 2018-12-07 | End: 2018-12-07 | Stop reason: SDUPTHER

## 2018-12-07 RX ORDER — HYDRALAZINE HYDROCHLORIDE 20 MG/ML
5 INJECTION INTRAMUSCULAR; INTRAVENOUS EVERY 5 MIN PRN
Status: DISCONTINUED | OUTPATIENT
Start: 2018-12-07 | End: 2018-12-11 | Stop reason: HOSPADM

## 2018-12-07 RX ORDER — DOCUSATE SODIUM 100 MG/1
100 CAPSULE, LIQUID FILLED ORAL 2 TIMES DAILY
Status: DISCONTINUED | OUTPATIENT
Start: 2018-12-08 | End: 2018-12-11 | Stop reason: HOSPADM

## 2018-12-07 RX ORDER — ACETAMINOPHEN 325 MG/1
650 TABLET ORAL EVERY 4 HOURS PRN
Status: DISCONTINUED | OUTPATIENT
Start: 2018-12-07 | End: 2018-12-11 | Stop reason: HOSPADM

## 2018-12-07 RX ORDER — SODIUM CHLORIDE 9 MG/ML
INJECTION, SOLUTION INTRAVENOUS CONTINUOUS PRN
Status: DISCONTINUED | OUTPATIENT
Start: 2018-12-07 | End: 2018-12-07 | Stop reason: SDUPTHER

## 2018-12-07 RX ORDER — FENTANYL CITRATE 50 UG/ML
25 INJECTION, SOLUTION INTRAMUSCULAR; INTRAVENOUS
Status: DISCONTINUED | OUTPATIENT
Start: 2018-12-07 | End: 2018-12-11 | Stop reason: HOSPADM

## 2018-12-07 RX ORDER — NICOTINE POLACRILEX 4 MG
15 LOZENGE BUCCAL PRN
Status: DISCONTINUED | OUTPATIENT
Start: 2018-12-07 | End: 2018-12-11 | Stop reason: HOSPADM

## 2018-12-07 RX ORDER — PROPOFOL 10 MG/ML
INJECTION, EMULSION INTRAVENOUS PRN
Status: DISCONTINUED | OUTPATIENT
Start: 2018-12-07 | End: 2018-12-07 | Stop reason: SDUPTHER

## 2018-12-07 RX ORDER — SODIUM CHLORIDE 9 MG/ML
INJECTION, SOLUTION INTRAVENOUS CONTINUOUS
Status: DISCONTINUED | OUTPATIENT
Start: 2018-12-07 | End: 2018-12-08

## 2018-12-07 RX ORDER — ONDANSETRON 2 MG/ML
4 INJECTION INTRAMUSCULAR; INTRAVENOUS EVERY 8 HOURS PRN
Status: DISCONTINUED | OUTPATIENT
Start: 2018-12-07 | End: 2018-12-11 | Stop reason: HOSPADM

## 2018-12-07 RX ORDER — MEPERIDINE HYDROCHLORIDE 50 MG/ML
25 INJECTION INTRAMUSCULAR; INTRAVENOUS; SUBCUTANEOUS
Status: ACTIVE | OUTPATIENT
Start: 2018-12-07 | End: 2018-12-07

## 2018-12-07 RX ORDER — ACETAMINOPHEN 650 MG/1
650 SUPPOSITORY RECTAL EVERY 4 HOURS PRN
Status: DISCONTINUED | OUTPATIENT
Start: 2018-12-07 | End: 2018-12-11 | Stop reason: HOSPADM

## 2018-12-07 RX ORDER — LIDOCAINE HYDROCHLORIDE 20 MG/ML
INJECTION, SOLUTION INFILTRATION; PERINEURAL PRN
Status: DISCONTINUED | OUTPATIENT
Start: 2018-12-07 | End: 2018-12-07 | Stop reason: SDUPTHER

## 2018-12-07 RX ORDER — MIDAZOLAM HYDROCHLORIDE 1 MG/ML
INJECTION INTRAMUSCULAR; INTRAVENOUS PRN
Status: DISCONTINUED | OUTPATIENT
Start: 2018-12-07 | End: 2018-12-07 | Stop reason: SDUPTHER

## 2018-12-07 RX ORDER — HEPARIN SODIUM 1000 [USP'U]/ML
INJECTION, SOLUTION INTRAVENOUS; SUBCUTANEOUS PRN
Status: DISCONTINUED | OUTPATIENT
Start: 2018-12-07 | End: 2018-12-07 | Stop reason: SDUPTHER

## 2018-12-07 RX ORDER — ETOMIDATE 2 MG/ML
INJECTION INTRAVENOUS PRN
Status: DISCONTINUED | OUTPATIENT
Start: 2018-12-07 | End: 2018-12-07 | Stop reason: SDUPTHER

## 2018-12-07 RX ORDER — VENLAFAXINE HYDROCHLORIDE 75 MG/1
75 CAPSULE, EXTENDED RELEASE ORAL DAILY
Status: DISCONTINUED | OUTPATIENT
Start: 2018-12-08 | End: 2018-12-11 | Stop reason: HOSPADM

## 2018-12-07 RX ORDER — SODIUM CHLORIDE 0.9 % (FLUSH) 0.9 %
10 SYRINGE (ML) INJECTION EVERY 12 HOURS SCHEDULED
Status: DISCONTINUED | OUTPATIENT
Start: 2018-12-07 | End: 2018-12-11 | Stop reason: HOSPADM

## 2018-12-07 RX ORDER — PROTAMINE SULFATE 10 MG/ML
50 INJECTION, SOLUTION INTRAVENOUS
Status: ACTIVE | OUTPATIENT
Start: 2018-12-07 | End: 2018-12-07

## 2018-12-07 RX ORDER — ALBUMIN, HUMAN INJ 5% 5 %
25 SOLUTION INTRAVENOUS PRN
Status: DISCONTINUED | OUTPATIENT
Start: 2018-12-07 | End: 2018-12-11 | Stop reason: HOSPADM

## 2018-12-07 RX ORDER — FENTANYL CITRATE 50 UG/ML
50 INJECTION, SOLUTION INTRAMUSCULAR; INTRAVENOUS
Status: DISCONTINUED | OUTPATIENT
Start: 2018-12-07 | End: 2018-12-11 | Stop reason: HOSPADM

## 2018-12-07 RX ORDER — CHLORHEXIDINE GLUCONATE 0.12 MG/ML
15 RINSE ORAL 2 TIMES DAILY
Status: DISCONTINUED | OUTPATIENT
Start: 2018-12-07 | End: 2018-12-11 | Stop reason: HOSPADM

## 2018-12-07 RX ORDER — CLOPIDOGREL BISULFATE 75 MG/1
75 TABLET ORAL DAILY
Status: DISCONTINUED | OUTPATIENT
Start: 2018-12-08 | End: 2018-12-11 | Stop reason: HOSPADM

## 2018-12-07 RX ORDER — OXYCODONE HYDROCHLORIDE AND ACETAMINOPHEN 5; 325 MG/1; MG/1
1 TABLET ORAL EVERY 4 HOURS PRN
Status: DISCONTINUED | OUTPATIENT
Start: 2018-12-07 | End: 2018-12-11 | Stop reason: HOSPADM

## 2018-12-07 RX ORDER — ROCURONIUM BROMIDE 10 MG/ML
INJECTION, SOLUTION INTRAVENOUS PRN
Status: DISCONTINUED | OUTPATIENT
Start: 2018-12-07 | End: 2018-12-07 | Stop reason: SDUPTHER

## 2018-12-07 RX ORDER — SODIUM CHLORIDE 0.9 % (FLUSH) 0.9 %
10 SYRINGE (ML) INJECTION PRN
Status: DISCONTINUED | OUTPATIENT
Start: 2018-12-07 | End: 2018-12-11 | Stop reason: HOSPADM

## 2018-12-07 RX ORDER — POLYETHYLENE GLYCOL 3350 17 G/17G
17 POWDER, FOR SOLUTION ORAL DAILY
Status: DISCONTINUED | OUTPATIENT
Start: 2018-12-08 | End: 2018-12-11 | Stop reason: HOSPADM

## 2018-12-07 RX ORDER — DEXTROSE MONOHYDRATE 25 G/50ML
12.5 INJECTION, SOLUTION INTRAVENOUS PRN
Status: DISCONTINUED | OUTPATIENT
Start: 2018-12-07 | End: 2018-12-11 | Stop reason: HOSPADM

## 2018-12-07 RX ORDER — ONDANSETRON 4 MG/1
4 TABLET, ORALLY DISINTEGRATING ORAL EVERY 8 HOURS PRN
Status: DISCONTINUED | OUTPATIENT
Start: 2018-12-07 | End: 2018-12-11 | Stop reason: HOSPADM

## 2018-12-07 RX ORDER — M-VIT,TX,IRON,MINS/CALC/FOLIC 27MG-0.4MG
1 TABLET ORAL
Status: DISCONTINUED | OUTPATIENT
Start: 2018-12-08 | End: 2018-12-11 | Stop reason: HOSPADM

## 2018-12-07 RX ORDER — SUMATRIPTAN 25 MG/1
25 TABLET, FILM COATED ORAL DAILY PRN
Status: DISCONTINUED | OUTPATIENT
Start: 2018-12-08 | End: 2018-12-11 | Stop reason: HOSPADM

## 2018-12-07 RX ORDER — LEVOTHYROXINE SODIUM 0.1 MG/1
200 TABLET ORAL DAILY
Status: DISCONTINUED | OUTPATIENT
Start: 2018-12-08 | End: 2018-12-11 | Stop reason: HOSPADM

## 2018-12-07 RX ORDER — OXYCODONE HYDROCHLORIDE AND ACETAMINOPHEN 5; 325 MG/1; MG/1
2 TABLET ORAL EVERY 4 HOURS PRN
Status: DISCONTINUED | OUTPATIENT
Start: 2018-12-07 | End: 2018-12-11 | Stop reason: HOSPADM

## 2018-12-07 RX ORDER — DIPHENHYDRAMINE HCL 25 MG
25 TABLET ORAL NIGHTLY PRN
Status: DISCONTINUED | OUTPATIENT
Start: 2018-12-08 | End: 2018-12-11 | Stop reason: HOSPADM

## 2018-12-07 RX ORDER — BISACODYL 10 MG
10 SUPPOSITORY, RECTAL RECTAL DAILY PRN
Status: DISCONTINUED | OUTPATIENT
Start: 2018-12-07 | End: 2018-12-11 | Stop reason: HOSPADM

## 2018-12-07 RX ORDER — FENTANYL CITRATE 50 UG/ML
INJECTION, SOLUTION INTRAMUSCULAR; INTRAVENOUS PRN
Status: DISCONTINUED | OUTPATIENT
Start: 2018-12-07 | End: 2018-12-07 | Stop reason: SDUPTHER

## 2018-12-07 RX ORDER — FAMOTIDINE 20 MG/1
20 TABLET, FILM COATED ORAL 2 TIMES DAILY
Status: DISCONTINUED | OUTPATIENT
Start: 2018-12-09 | End: 2018-12-11 | Stop reason: HOSPADM

## 2018-12-07 RX ORDER — KETOROLAC TROMETHAMINE 30 MG/ML
30 INJECTION, SOLUTION INTRAMUSCULAR; INTRAVENOUS EVERY 6 HOURS PRN
Status: DISCONTINUED | OUTPATIENT
Start: 2018-12-07 | End: 2018-12-11 | Stop reason: HOSPADM

## 2018-12-07 RX ORDER — ATORVASTATIN CALCIUM 40 MG/1
40 TABLET, FILM COATED ORAL NIGHTLY
Status: DISCONTINUED | OUTPATIENT
Start: 2018-12-08 | End: 2018-12-11 | Stop reason: HOSPADM

## 2018-12-07 RX ORDER — DEXTROSE MONOHYDRATE 50 MG/ML
100 INJECTION, SOLUTION INTRAVENOUS PRN
Status: DISCONTINUED | OUTPATIENT
Start: 2018-12-07 | End: 2018-12-11 | Stop reason: HOSPADM

## 2018-12-07 RX ORDER — ASPIRIN 81 MG/1
81 TABLET ORAL DAILY
Status: DISCONTINUED | OUTPATIENT
Start: 2018-12-07 | End: 2018-12-11 | Stop reason: HOSPADM

## 2018-12-07 RX ORDER — ALBUMIN, HUMAN INJ 5% 5 %
SOLUTION INTRAVENOUS
Status: COMPLETED
Start: 2018-12-07 | End: 2018-12-07

## 2018-12-07 RX ADMIN — INSULIN LISPRO 1 UNITS: 100 INJECTION, SOLUTION INTRAVENOUS; SUBCUTANEOUS at 22:58

## 2018-12-07 RX ADMIN — FAMOTIDINE 20 MG: 10 INJECTION, SOLUTION INTRAVENOUS at 20:17

## 2018-12-07 RX ADMIN — OXYCODONE AND ACETAMINOPHEN 1 TABLET: 5; 325 TABLET ORAL at 22:56

## 2018-12-07 RX ADMIN — PROTAMINE SULFATE 250 MG: 10 INJECTION, SOLUTION INTRAVENOUS at 17:44

## 2018-12-07 RX ADMIN — FENTANYL CITRATE 100 MCG: 50 INJECTION, SOLUTION INTRAMUSCULAR; INTRAVENOUS at 14:51

## 2018-12-07 RX ADMIN — SODIUM BICARBONATE 50 MEQ: 84 INJECTION, SOLUTION INTRAVENOUS at 21:35

## 2018-12-07 RX ADMIN — HEPARIN SODIUM AND DEXTROSE 12.1 UNITS/KG/HR: 10000; 5 INJECTION INTRAVENOUS at 06:38

## 2018-12-07 RX ADMIN — ROCURONIUM BROMIDE 50 MG: 10 INJECTION, SOLUTION INTRAVENOUS at 16:04

## 2018-12-07 RX ADMIN — ASPIRIN 81 MG: 81 TABLET, COATED ORAL at 08:37

## 2018-12-07 RX ADMIN — PANTOPRAZOLE SODIUM 40 MG: 40 TABLET, DELAYED RELEASE ORAL at 06:40

## 2018-12-07 RX ADMIN — METOPROLOL TARTRATE 25 MG: 25 TABLET ORAL at 08:37

## 2018-12-07 RX ADMIN — ALBUMIN (HUMAN) 25 G: 12.5 INJECTION, SOLUTION INTRAVENOUS at 19:36

## 2018-12-07 RX ADMIN — CHLORHEXIDINE GLUCONATE 15 ML: 1.2 RINSE ORAL at 21:48

## 2018-12-07 RX ADMIN — FENTANYL CITRATE 25 MCG: 50 INJECTION, SOLUTION INTRAMUSCULAR; INTRAVENOUS at 15:55

## 2018-12-07 RX ADMIN — ROCURONIUM BROMIDE 50 MG: 10 INJECTION, SOLUTION INTRAVENOUS at 14:51

## 2018-12-07 RX ADMIN — MUPIROCIN: 20 OINTMENT TOPICAL at 22:59

## 2018-12-07 RX ADMIN — FENTANYL CITRATE 100 MCG: 50 INJECTION, SOLUTION INTRAMUSCULAR; INTRAVENOUS at 14:25

## 2018-12-07 RX ADMIN — VENLAFAXINE HYDROCHLORIDE 75 MG: 75 CAPSULE, EXTENDED RELEASE ORAL at 08:37

## 2018-12-07 RX ADMIN — PHENYLEPHRINE HYDROCHLORIDE 50 MCG: 10 INJECTION INTRAVENOUS at 15:15

## 2018-12-07 RX ADMIN — FENTANYL CITRATE 100 MCG: 50 INJECTION, SOLUTION INTRAMUSCULAR; INTRAVENOUS at 17:55

## 2018-12-07 RX ADMIN — Medication 0.04 MCG/KG/MIN: at 17:24

## 2018-12-07 RX ADMIN — SODIUM CHLORIDE: 9 INJECTION, SOLUTION INTRAVENOUS at 13:27

## 2018-12-07 RX ADMIN — ROCURONIUM BROMIDE 50 MG: 10 INJECTION, SOLUTION INTRAVENOUS at 17:26

## 2018-12-07 RX ADMIN — ALBUMIN (HUMAN) 25 G: 12.5 INJECTION, SOLUTION INTRAVENOUS at 20:41

## 2018-12-07 RX ADMIN — ROCURONIUM BROMIDE 50 MG: 10 INJECTION, SOLUTION INTRAVENOUS at 15:31

## 2018-12-07 RX ADMIN — MIDAZOLAM 2 MG: 1 INJECTION INTRAMUSCULAR; INTRAVENOUS at 17:21

## 2018-12-07 RX ADMIN — FENTANYL CITRATE 100 MCG: 50 INJECTION, SOLUTION INTRAMUSCULAR; INTRAVENOUS at 18:05

## 2018-12-07 RX ADMIN — FENTANYL CITRATE 100 MCG: 50 INJECTION, SOLUTION INTRAMUSCULAR; INTRAVENOUS at 14:00

## 2018-12-07 RX ADMIN — Medication 10 ML: at 20:18

## 2018-12-07 RX ADMIN — ALBUMIN (HUMAN) 25 G: 12.5 INJECTION, SOLUTION INTRAVENOUS at 19:09

## 2018-12-07 RX ADMIN — FENTANYL CITRATE 75 MCG: 50 INJECTION, SOLUTION INTRAMUSCULAR; INTRAVENOUS at 15:59

## 2018-12-07 RX ADMIN — FENTANYL CITRATE 100 MCG: 50 INJECTION, SOLUTION INTRAMUSCULAR; INTRAVENOUS at 15:01

## 2018-12-07 RX ADMIN — MIDAZOLAM 2 MG: 1 INJECTION INTRAMUSCULAR; INTRAVENOUS at 15:53

## 2018-12-07 RX ADMIN — PHENYLEPHRINE HYDROCHLORIDE 100 MCG: 10 INJECTION INTRAVENOUS at 14:56

## 2018-12-07 RX ADMIN — ETOMIDATE 16 MG: 2 INJECTION, SOLUTION INTRAVENOUS at 13:33

## 2018-12-07 RX ADMIN — DEXTROSE MONOHYDRATE 2 G: 50 INJECTION, SOLUTION INTRAVENOUS at 20:10

## 2018-12-07 RX ADMIN — ROCURONIUM BROMIDE 50 MG: 10 INJECTION, SOLUTION INTRAVENOUS at 14:20

## 2018-12-07 RX ADMIN — SODIUM CHLORIDE: 9 INJECTION, SOLUTION INTRAVENOUS at 20:06

## 2018-12-07 RX ADMIN — PROPOFOL 40 MG: 10 INJECTION, EMULSION INTRAVENOUS at 16:02

## 2018-12-07 RX ADMIN — FENTANYL CITRATE 25 MCG: 50 INJECTION, SOLUTION INTRAMUSCULAR; INTRAVENOUS at 21:02

## 2018-12-07 RX ADMIN — ISOSORBIDE MONONITRATE 30 MG: 30 TABLET ORAL at 08:37

## 2018-12-07 RX ADMIN — FENTANYL CITRATE 300 MCG: 50 INJECTION, SOLUTION INTRAMUSCULAR; INTRAVENOUS at 13:33

## 2018-12-07 RX ADMIN — PROPOFOL 40 MG: 10 INJECTION, EMULSION INTRAVENOUS at 13:33

## 2018-12-07 RX ADMIN — FENTANYL CITRATE 100 MCG: 50 INJECTION, SOLUTION INTRAMUSCULAR; INTRAVENOUS at 13:53

## 2018-12-07 RX ADMIN — VANCOMYCIN HYDROCHLORIDE 1.5 G: 1 INJECTION, POWDER, LYOPHILIZED, FOR SOLUTION INTRAVENOUS at 14:29

## 2018-12-07 RX ADMIN — FENTANYL CITRATE 100 MCG: 50 INJECTION, SOLUTION INTRAMUSCULAR; INTRAVENOUS at 14:15

## 2018-12-07 RX ADMIN — LEVOTHYROXINE SODIUM 200 MCG: 200 TABLET ORAL at 06:40

## 2018-12-07 RX ADMIN — KETOROLAC TROMETHAMINE 30 MG: 30 INJECTION, SOLUTION INTRAMUSCULAR at 22:20

## 2018-12-07 RX ADMIN — HEPARIN SODIUM 30000 UNITS: 1000 INJECTION, SOLUTION INTRAVENOUS; SUBCUTANEOUS at 15:35

## 2018-12-07 RX ADMIN — FENTANYL CITRATE 100 MCG: 50 INJECTION, SOLUTION INTRAMUSCULAR; INTRAVENOUS at 17:58

## 2018-12-07 RX ADMIN — ROCURONIUM BROMIDE 50 MG: 10 INJECTION, SOLUTION INTRAVENOUS at 13:33

## 2018-12-07 RX ADMIN — AMINOCAPROIC ACID 1 G/HR: 250 INJECTION, SOLUTION INTRAVENOUS at 14:30

## 2018-12-07 RX ADMIN — LIDOCAINE HYDROCHLORIDE 100 MG: 20 INJECTION, SOLUTION INFILTRATION; PERINEURAL at 13:33

## 2018-12-07 RX ADMIN — HEPARIN SODIUM 2000 UNITS: 1000 INJECTION INTRAVENOUS; SUBCUTANEOUS at 08:23

## 2018-12-07 RX ADMIN — MIDAZOLAM 2 MG: 1 INJECTION INTRAMUSCULAR; INTRAVENOUS at 13:27

## 2018-12-07 ASSESSMENT — PULMONARY FUNCTION TESTS
PIF_VALUE: 22
PIF_VALUE: 23
PIF_VALUE: 0
PIF_VALUE: 1
PIF_VALUE: 3
PIF_VALUE: 23
PIF_VALUE: 26
PIF_VALUE: 25
PIF_VALUE: 0
PIF_VALUE: 23
PIF_VALUE: 29
PIF_VALUE: 5
PIF_VALUE: 23
PIF_VALUE: 1
PIF_VALUE: 24
PIF_VALUE: 0
PIF_VALUE: 25
PIF_VALUE: 24
PIF_VALUE: 0
PIF_VALUE: 23
PIF_VALUE: 30
PIF_VALUE: 25
PIF_VALUE: 23
PIF_VALUE: 26
PIF_VALUE: 23
PIF_VALUE: 25
PIF_VALUE: 24
PIF_VALUE: 23
PIF_VALUE: 1
PIF_VALUE: 25
PIF_VALUE: 1
PIF_VALUE: 22
PIF_VALUE: 22
PIF_VALUE: 23
PIF_VALUE: 25
PIF_VALUE: 27
PIF_VALUE: 1
PIF_VALUE: 25
PIF_VALUE: 23
PIF_VALUE: 0
PIF_VALUE: 1
PIF_VALUE: 27
PIF_VALUE: 23
PIF_VALUE: 22
PIF_VALUE: 23
PIF_VALUE: 1
PIF_VALUE: 25
PIF_VALUE: 23
PIF_VALUE: 0
PIF_VALUE: 1
PIF_VALUE: 22
PIF_VALUE: 25
PIF_VALUE: 23
PIF_VALUE: 23
PIF_VALUE: 33
PIF_VALUE: 1
PIF_VALUE: 26
PIF_VALUE: 1
PIF_VALUE: 24
PIF_VALUE: 1
PIF_VALUE: 0
PIF_VALUE: 33
PIF_VALUE: 23
PIF_VALUE: 25
PIF_VALUE: 23
PIF_VALUE: 22
PIF_VALUE: 22
PIF_VALUE: 30
PIF_VALUE: 23
PIF_VALUE: 1
PIF_VALUE: 2
PIF_VALUE: 24
PIF_VALUE: 24
PIF_VALUE: 25
PIF_VALUE: 24
PIF_VALUE: 1
PIF_VALUE: 26
PIF_VALUE: 25
PIF_VALUE: 24
PIF_VALUE: 25
PIF_VALUE: 24
PIF_VALUE: 21
PIF_VALUE: 24
PIF_VALUE: 20
PIF_VALUE: 23
PIF_VALUE: 1
PIF_VALUE: 1
PIF_VALUE: 24
PIF_VALUE: 1
PIF_VALUE: 1
PIF_VALUE: 25
PIF_VALUE: 26
PIF_VALUE: 1
PIF_VALUE: 23
PIF_VALUE: 23
PIF_VALUE: 25
PIF_VALUE: 0
PIF_VALUE: 22
PIF_VALUE: 25
PIF_VALUE: 25
PIF_VALUE: 1
PIF_VALUE: 25
PIF_VALUE: 1
PIF_VALUE: 25
PIF_VALUE: 27
PIF_VALUE: 23
PIF_VALUE: 22
PIF_VALUE: 1
PIF_VALUE: 23
PIF_VALUE: 24
PIF_VALUE: 25
PIF_VALUE: 25
PIF_VALUE: 2
PIF_VALUE: 7
PIF_VALUE: 25
PIF_VALUE: 1
PIF_VALUE: 2
PIF_VALUE: 23
PIF_VALUE: 27
PIF_VALUE: 23
PIF_VALUE: 1
PIF_VALUE: 3
PIF_VALUE: 26
PIF_VALUE: 29
PIF_VALUE: 26
PIF_VALUE: 0
PIF_VALUE: 0
PIF_VALUE: 22
PIF_VALUE: 23
PIF_VALUE: 28
PIF_VALUE: 21
PIF_VALUE: 24
PIF_VALUE: 23
PIF_VALUE: 24
PIF_VALUE: 23
PIF_VALUE: 24
PIF_VALUE: 25
PIF_VALUE: 1
PIF_VALUE: 24
PIF_VALUE: 25
PIF_VALUE: 21
PIF_VALUE: 1
PIF_VALUE: 27
PIF_VALUE: 24
PIF_VALUE: 26
PIF_VALUE: 25
PIF_VALUE: 24
PIF_VALUE: 22
PIF_VALUE: 21
PIF_VALUE: 25
PIF_VALUE: 1
PIF_VALUE: 23
PIF_VALUE: 26
PIF_VALUE: 25
PIF_VALUE: 24
PIF_VALUE: 23
PIF_VALUE: 26
PIF_VALUE: 1
PIF_VALUE: 22
PIF_VALUE: 23
PIF_VALUE: 26
PIF_VALUE: 26
PIF_VALUE: 29
PIF_VALUE: 25
PIF_VALUE: 25
PIF_VALUE: 22
PIF_VALUE: 0
PIF_VALUE: 25
PIF_VALUE: 1
PIF_VALUE: 24
PIF_VALUE: 25
PIF_VALUE: 1
PIF_VALUE: 23
PIF_VALUE: 26
PIF_VALUE: 26
PIF_VALUE: 25
PIF_VALUE: 1
PIF_VALUE: 26
PIF_VALUE: 1
PIF_VALUE: 23
PIF_VALUE: 21
PIF_VALUE: 22
PIF_VALUE: 1
PIF_VALUE: 21
PIF_VALUE: 1
PIF_VALUE: 23
PIF_VALUE: 22
PIF_VALUE: 23
PIF_VALUE: 22
PIF_VALUE: 27
PIF_VALUE: 24
PIF_VALUE: 1
PIF_VALUE: 24
PIF_VALUE: 22
PIF_VALUE: 22
PIF_VALUE: 26
PIF_VALUE: 25
PIF_VALUE: 1
PIF_VALUE: 1
PIF_VALUE: 23
PIF_VALUE: 23
PIF_VALUE: 25
PIF_VALUE: 2
PIF_VALUE: 23
PIF_VALUE: 24
PIF_VALUE: 0
PIF_VALUE: 22
PIF_VALUE: 1
PIF_VALUE: 22
PIF_VALUE: 22
PIF_VALUE: 1
PIF_VALUE: 23
PIF_VALUE: 23
PIF_VALUE: 27
PIF_VALUE: 23
PIF_VALUE: 22
PIF_VALUE: 26
PIF_VALUE: 1
PIF_VALUE: 1
PIF_VALUE: 24
PIF_VALUE: 24
PIF_VALUE: 26
PIF_VALUE: 21
PIF_VALUE: 24
PIF_VALUE: 24
PIF_VALUE: 22
PIF_VALUE: 25
PIF_VALUE: 22
PIF_VALUE: 27
PIF_VALUE: 1
PIF_VALUE: 27
PIF_VALUE: 22
PIF_VALUE: 1
PIF_VALUE: 28
PIF_VALUE: 0
PIF_VALUE: 1
PIF_VALUE: 29
PIF_VALUE: 0
PIF_VALUE: 25
PIF_VALUE: 25
PIF_VALUE: 26
PIF_VALUE: 1
PIF_VALUE: 22
PIF_VALUE: 24
PIF_VALUE: 29
PIF_VALUE: 25
PIF_VALUE: 24
PIF_VALUE: 22
PIF_VALUE: 22
PIF_VALUE: 26
PIF_VALUE: 24
PIF_VALUE: 29
PIF_VALUE: 23
PIF_VALUE: 0
PIF_VALUE: 0
PIF_VALUE: 25
PIF_VALUE: 23
PIF_VALUE: 23
PIF_VALUE: 25
PIF_VALUE: 22
PIF_VALUE: 22
PIF_VALUE: 27
PIF_VALUE: 1
PIF_VALUE: 25
PIF_VALUE: 25
PIF_VALUE: 27
PIF_VALUE: 21
PIF_VALUE: 25
PIF_VALUE: 25
PIF_VALUE: 1
PIF_VALUE: 27
PIF_VALUE: 25
PIF_VALUE: 30
PIF_VALUE: 23
PIF_VALUE: 26
PIF_VALUE: 1
PIF_VALUE: 23
PIF_VALUE: 25
PIF_VALUE: 22
PIF_VALUE: 0
PIF_VALUE: 7
PIF_VALUE: 24
PIF_VALUE: 23
PIF_VALUE: 23
PIF_VALUE: 26
PIF_VALUE: 23
PIF_VALUE: 25
PIF_VALUE: 0
PIF_VALUE: 1
PIF_VALUE: 25
PIF_VALUE: 22
PIF_VALUE: 21
PIF_VALUE: 25
PIF_VALUE: 21
PIF_VALUE: 1
PIF_VALUE: 23
PIF_VALUE: 1
PIF_VALUE: 23
PIF_VALUE: 22
PIF_VALUE: 27
PIF_VALUE: 24
PIF_VALUE: 27
PIF_VALUE: 23
PIF_VALUE: 27
PIF_VALUE: 25
PIF_VALUE: 26
PIF_VALUE: 0
PIF_VALUE: 25
PIF_VALUE: 29
PIF_VALUE: 23
PIF_VALUE: 22
PIF_VALUE: 1
PIF_VALUE: 1

## 2018-12-07 ASSESSMENT — PAIN SCALES - GENERAL
PAINLEVEL_OUTOF10: 8
PAINLEVEL_OUTOF10: 0
PAINLEVEL_OUTOF10: 10

## 2018-12-07 ASSESSMENT — PAIN DESCRIPTION - PAIN TYPE
TYPE: ACUTE PAIN;SURGICAL PAIN
TYPE: ACUTE PAIN;SURGICAL PAIN

## 2018-12-07 ASSESSMENT — PAIN DESCRIPTION - LOCATION
LOCATION: CHEST;BACK;NECK;SHOULDER
LOCATION: BACK;CHEST;NECK;SHOULDER

## 2018-12-07 ASSESSMENT — PAIN DESCRIPTION - DESCRIPTORS
DESCRIPTORS: ACHING;DISCOMFORT;DULL;SORE
DESCRIPTORS: ACHING;CONSTANT;DISCOMFORT;DULL

## 2018-12-07 ASSESSMENT — LIFESTYLE VARIABLES: SMOKING_STATUS: 1

## 2018-12-07 ASSESSMENT — PAIN DESCRIPTION - FREQUENCY: FREQUENCY: CONTINUOUS

## 2018-12-07 NOTE — DISCHARGE INSTR - COC
[phone]  · Fax:  [phone]    Dialysis Facility (if applicable)   · Name:  · Address:  · Dialysis Schedule:  · Phone:  · Fax:    / signature: Electronically signed by Gisel Vallejo RN on 12/7/18 at 11:42 AM    PHYSICIAN SECTION  · Pt to walk at least 300ft 6 times daily, increasing total distance to 1-2 miles per day by 6 weeks post-op. · Use Incentive spirometer and acapella 10 times every hour while awake. · INCISION CARE TO MIDSTERNAL CHEST AND right LEG AT LEAST DAILY WITH HIBICLENS. · BATHE WITH HIBICLENS, do not wash genitals or face. · Prescriptions in discharge binder. · Keep follow up appointments per discharge paperwork. · Skilled nurse to contact the surgeon's office at [phone] for any abnormal assessment or contact [address] [place] [address] Cardiovascular Thoracic Unit at [phone]      Prognosis: Good    Condition at Discharge: Stable    Rehab Potential (if transferring to Rehab): Good    Recommended Labs or Other Treatments After Discharge:     Physician Certification: I certify the above information and transfer of Belle Serrano  is necessary for the continuing treatment of the diagnosis listed and that she requires [facility] for less 30 days.      Update Admission H&P: No change in H&P    PHYSICIAN SIGNATURE:  Electronically signed by Kristen Louis MD on 12/11/18 at 5:26 PM

## 2018-12-07 NOTE — ANESTHESIA PRE PROCEDURE
Department of Anesthesiology  Preprocedure Note       Name:  Jacques Shone   Age:  63 y.o.  :  1960                                          MRN:  [id number]         Date:  2018      Surgeon: Song Titus):  Elvis Gutierrez MD    Procedure: CABG CORONARY ARTERY BYPASS  ON PUMP  SWAN AND ADAM (N/A )    Medications prior to admission:   Prior to Admission medications    Medication Sig Start Date End Date Taking? Authorizing Provider   aspirin 81 MG EC tablet Take 81 mg by mouth daily   Yes Historical Provider, MD   levothyroxine (SYNTHROID) 200 MCG tablet Take 200 mcg by mouth Daily   Yes Historical Provider, MD   nicotine (NICODERM CQ) 14 MG/24HR Place 1 patch onto the skin every 24 hours   Yes Historical Provider, MD   SUMAtriptan (IMITREX) 25 MG tablet Take 25 mg by mouth daily as needed for Migraine   Yes Historical Provider, MD   metoprolol succinate (TOPROL XL) 25 MG extended release tablet Take 25 mg by mouth daily Take half tablet daily   Yes Historical Provider, MD   venlafaxine (EFFEXOR XR) 75 MG extended release capsule Take 75 mg by mouth daily   Yes Historical Provider, MD   nitroGLYCERIN (NITROSTAT) 0.4 MG SL tablet Place 0.4 mg under the tongue every 5 minutes as needed for Chest pain up to max of 3 total doses. If no relief after 1 dose, call 911.    Yes Historical Provider, MD   isosorbide mononitrate (IMDUR) 30 MG extended release tablet Take 30 mg by mouth daily   Yes Historical Provider, MD   omeprazole (PRILOSEC) 40 MG capsule 2 times daily  14  Yes Historical Provider, MD   lisinopril (PRINIVIL;ZESTRIL) 5 MG tablet daily  10/20/14  Yes Historical Provider, MD       Current medications:    Current Facility-Administered Medications   Medication Dose Route Frequency Provider Last Rate Last Dose    pantoprazole (PROTONIX) tablet 40 mg  40 mg Oral QAM AC Henny Wolf MD   40 mg at 18 0640    aspirin EC tablet 81 mg  81 mg Oral Daily Henny Wolf MD   81 mg at 18 Past Medical History:        Diagnosis Date    Allergic rhinitis     Anxiety     GERD (gastroesophageal reflux disease)     Hyperlipidemia     Hypertension     Hypothyroidism     hypothyroid    Osteoarthritis     Skin disorder        Past Surgical History:        Procedure Laterality Date    APPENDECTOMY  1997    BREAST LUMPECTOMY      BUNIONECTOMY  1992    CARDIAC CATHETERIZATION  2007    HYSTERECTOMY  1996    LAPAROSCOPY  1997    LIVER BIOPSY         Social History:    Social History   Substance Use Topics    Smoking status: Current Every Day Smoker     Types: Cigarettes    Smokeless tobacco: Never Used    Alcohol use Yes                                Ready to quit: Not Answered  Counseling given: Not Answered      Vital Signs (Current):   Vitals:    12/06/18 2236 12/07/18 0000 12/07/18 0400 12/07/18 0800   BP: (!) 145/55 (!) 152/58 (!) 144/73 127/65   Pulse: 102 84 72 79   Resp: 16 16 16 20   Temp:  97.9 °F (36.6 °C)  97.9 °F (36.6 °C)   TempSrc:  Temporal  Temporal   SpO2:  95% 95% 97%   Weight:       Height:                                                  BP Readings from Last 3 Encounters:   12/07/18 127/65   04/19/18 (!) 158/82   03/23/18 (!) 155/61       NPO Status: Time of last liquid consumption: 2300                        Time of last solid consumption: 2300                        Date of last liquid consumption: 12/05/18                        Date of last solid food consumption: 12/05/18    BMI:   Wt Readings from Last 3 Encounters:   12/06/18 218 lb (98.9 kg)   04/19/18 210 lb (95.3 kg)   03/23/18 213 lb 1.6 oz (96.7 kg)     Body mass index is 38.62 kg/m².     CBC:   Lab Results   Component Value Date    WBC 12.6 12/07/2018    RBC 4.62 12/07/2018    HGB 12.9 12/07/2018    HCT 38.0 12/07/2018    MCV 82.4 12/07/2018    RDW 14.3 12/07/2018     12/07/2018       CMP:   Lab Results   Component Value Date     12/07/2018    K 4.2 12/07/2018     12/07/2018    CO2 23

## 2018-12-08 ENCOUNTER — APPOINTMENT (OUTPATIENT)
Dept: GENERAL RADIOLOGY | Age: 58
DRG: 234 | End: 2018-12-08
Attending: INTERNAL MEDICINE
Payer: COMMERCIAL

## 2018-12-08 LAB
ANION GAP SERPL CALCULATED.3IONS-SCNC: 14 MMOL/L (ref 9–17)
BUN BLDV-MCNC: 15 MG/DL (ref 6–20)
BUN/CREAT BLD: 16 (ref 9–20)
CALCIUM SERPL-MCNC: 8.5 MG/DL (ref 8.6–10.4)
CHLORIDE BLD-SCNC: 110 MMOL/L (ref 98–107)
CO2: 23 MMOL/L (ref 20–31)
CREAT SERPL-MCNC: 0.94 MG/DL (ref 0.5–0.9)
EKG ATRIAL RATE: 91 BPM
EKG P AXIS: 72 DEGREES
EKG P-R INTERVAL: 134 MS
EKG Q-T INTERVAL: 392 MS
EKG QRS DURATION: 80 MS
EKG QTC CALCULATION (BAZETT): 482 MS
EKG R AXIS: 14 DEGREES
EKG T AXIS: 31 DEGREES
EKG VENTRICULAR RATE: 91 BPM
FIO2: 40
FIO2: 40
FIO2: ABNORMAL
FIO2: NORMAL
GFR AFRICAN AMERICAN: >60 ML/MIN
GFR NON-AFRICAN AMERICAN: >60 ML/MIN
GFR SERPL CREATININE-BSD FRML MDRD: ABNORMAL ML/MIN/{1.73_M2}
GFR SERPL CREATININE-BSD FRML MDRD: ABNORMAL ML/MIN/{1.73_M2}
GLUCOSE BLD-MCNC: 107 MG/DL (ref 65–105)
GLUCOSE BLD-MCNC: 107 MG/DL (ref 74–106)
GLUCOSE BLD-MCNC: 119 MG/DL (ref 65–105)
GLUCOSE BLD-MCNC: 120 MG/DL (ref 65–105)
GLUCOSE BLD-MCNC: 122 MG/DL (ref 74–106)
GLUCOSE BLD-MCNC: 138 MG/DL (ref 74–106)
GLUCOSE BLD-MCNC: 145 MG/DL (ref 74–106)
GLUCOSE BLD-MCNC: 157 MG/DL (ref 74–106)
GLUCOSE BLD-MCNC: 161 MG/DL (ref 65–105)
GLUCOSE BLD-MCNC: 161 MG/DL (ref 70–99)
HCT VFR BLD CALC: 27.1 % (ref 36–46)
HEMOGLOBIN: 9.1 G/DL (ref 12–16)
INR BLD: 1.2
MAGNESIUM: 2.3 MG/DL (ref 1.6–2.6)
MCH RBC QN AUTO: 28.1 PG (ref 26–34)
MCHC RBC AUTO-ENTMCNC: 33.6 G/DL (ref 31–37)
MCV RBC AUTO: 83.8 FL (ref 80–100)
NEGATIVE BASE EXCESS, ART: 1 (ref 0–2)
NEGATIVE BASE EXCESS, ART: 2 (ref 0–2)
NEGATIVE BASE EXCESS, ART: 4 (ref 0–2)
NEGATIVE BASE EXCESS, ART: 4 (ref 0–2)
NEGATIVE BASE EXCESS, ART: 6 (ref 0–2)
NEGATIVE BASE EXCESS, ART: ABNORMAL (ref 0–2)
NRBC AUTOMATED: ABNORMAL PER 100 WBC
O2 DEVICE/FLOW/%: ABNORMAL
O2 DEVICE/FLOW/%: NORMAL
PATIENT TEMP: 36.4
PATIENT TEMP: 36.5
PATIENT TEMP: ABNORMAL
PATIENT TEMP: NORMAL
PDW BLD-RTO: 14.9 % (ref 11.5–14.5)
PLATELET # BLD: 117 K/UL (ref 130–400)
PMV BLD AUTO: 8.8 FL (ref 6–12)
POC HCO3: 20.3 MMOL/L (ref 22–27)
POC HCO3: 21.9 MMOL/L (ref 22–27)
POC HCO3: 22.7 MMOL/L (ref 22–27)
POC HCO3: 23.4 MMOL/L (ref 22–27)
POC HCO3: 24.9 MMOL/L (ref 22–27)
POC HCO3: 28.2 MMOL/L (ref 22–27)
POC HEMATOCRIT: 21 % (ref 36–46)
POC HEMATOCRIT: 23 % (ref 36–46)
POC HEMATOCRIT: 26 % (ref 36–46)
POC HEMATOCRIT: 32 % (ref 36–46)
POC HEMATOCRIT: 33 % (ref 36–46)
POC HEMOGLOBIN: 10.9 G/DL (ref 12–16)
POC HEMOGLOBIN: 11.2 G/DL (ref 12–16)
POC HEMOGLOBIN: 7.1 G/DL (ref 12–16)
POC HEMOGLOBIN: 7.8 G/DL (ref 12–16)
POC HEMOGLOBIN: 8.8 G/DL (ref 12–16)
POC IONIZED CALCIUM: 0.94 MMOL/L (ref 1.13–1.33)
POC IONIZED CALCIUM: 1.1 MMOL/L (ref 1.13–1.33)
POC IONIZED CALCIUM: 1.27 MMOL/L (ref 1.13–1.33)
POC IONIZED CALCIUM: 1.31 MMOL/L (ref 1.13–1.33)
POC O2 SATURATION: 100 %
POC O2 SATURATION: 100 %
POC O2 SATURATION: 94 %
POC O2 SATURATION: 95 %
POC O2 SATURATION: 95 %
POC O2 SATURATION: 99 %
POC PCO2 TEMP: ABNORMAL MM HG
POC PCO2 TEMP: NORMAL MM HG
POC PCO2: 41 MM HG (ref 32–45)
POC PCO2: 43 MM HG (ref 32–45)
POC PCO2: 44 MM HG (ref 32–45)
POC PCO2: 46 MM HG (ref 32–45)
POC PCO2: 46 MM HG (ref 32–45)
POC PCO2: 47 MM HG (ref 32–45)
POC PH TEMP: ABNORMAL
POC PH TEMP: NORMAL
POC PH: 7.28 (ref 7.35–7.45)
POC PH: 7.3 (ref 7.35–7.45)
POC PH: 7.31 (ref 7.35–7.45)
POC PH: 7.34 (ref 7.35–7.45)
POC PH: 7.36 (ref 7.35–7.45)
POC PH: 7.38 (ref 7.35–7.45)
POC PO2 TEMP: ABNORMAL MM HG
POC PO2 TEMP: NORMAL MM HG
POC PO2: 165 MM HG (ref 75–95)
POC PO2: 305 MM HG (ref 75–95)
POC PO2: 398 MM HG (ref 75–95)
POC PO2: 77 MM HG (ref 75–95)
POC PO2: 79 MM HG (ref 75–95)
POC PO2: 82 MM HG (ref 75–95)
POC POTASSIUM: 4.3 MMOL/L (ref 3.5–5.1)
POC POTASSIUM: 4.3 MMOL/L (ref 3.5–5.1)
POC POTASSIUM: 4.8 MMOL/L (ref 3.5–5.1)
POC POTASSIUM: 5.1 MMOL/L (ref 3.5–5.1)
POC POTASSIUM: 5.9 MMOL/L (ref 3.5–5.1)
POC SODIUM: 141 MMOL/L (ref 136–145)
POSITIVE BASE EXCESS, ART: 3 (ref 0–2)
POSITIVE BASE EXCESS, ART: ABNORMAL (ref 0–2)
POSITIVE BASE EXCESS, ART: NORMAL (ref 0–2)
POTASSIUM SERPL-SCNC: 4.1 MMOL/L (ref 3.7–5.3)
POTASSIUM SERPL-SCNC: 4.1 MMOL/L (ref 3.7–5.3)
PROTHROMBIN TIME: 11.9 SEC (ref 9.7–11.6)
RBC # BLD: 3.23 M/UL (ref 4–5.2)
SODIUM BLD-SCNC: 147 MMOL/L (ref 135–144)
TCO2 (CALC), ART: 22 MMOL/L (ref 23–28)
TCO2 (CALC), ART: 23 MMOL/L (ref 23–28)
TCO2 (CALC), ART: 24 MMOL/L (ref 23–28)
TCO2 (CALC), ART: 25 MMOL/L (ref 23–28)
TCO2 (CALC), ART: 26 MMOL/L (ref 23–28)
TCO2 (CALC), ART: 30 MMOL/L (ref 23–28)
WBC # BLD: 7.8 K/UL (ref 3.5–11)

## 2018-12-08 PROCEDURE — 6360000002 HC RX W HCPCS: Performed by: NURSE PRACTITIONER

## 2018-12-08 PROCEDURE — 6360000002 HC RX W HCPCS: Performed by: THORACIC SURGERY (CARDIOTHORACIC VASCULAR SURGERY)

## 2018-12-08 PROCEDURE — 97166 OT EVAL MOD COMPLEX 45 MIN: CPT

## 2018-12-08 PROCEDURE — 2500000003 HC RX 250 WO HCPCS: Performed by: PHYSICIAN ASSISTANT

## 2018-12-08 PROCEDURE — 97535 SELF CARE MNGMENT TRAINING: CPT

## 2018-12-08 PROCEDURE — 83735 ASSAY OF MAGNESIUM: CPT

## 2018-12-08 PROCEDURE — 84132 ASSAY OF SERUM POTASSIUM: CPT

## 2018-12-08 PROCEDURE — 85610 PROTHROMBIN TIME: CPT

## 2018-12-08 PROCEDURE — 97161 PT EVAL LOW COMPLEX 20 MIN: CPT

## 2018-12-08 PROCEDURE — 80048 BASIC METABOLIC PNL TOTAL CA: CPT

## 2018-12-08 PROCEDURE — 2000000000 HC ICU R&B

## 2018-12-08 PROCEDURE — S0028 INJECTION, FAMOTIDINE, 20 MG: HCPCS | Performed by: PHYSICIAN ASSISTANT

## 2018-12-08 PROCEDURE — G8987 SELF CARE CURRENT STATUS: HCPCS

## 2018-12-08 PROCEDURE — G8979 MOBILITY GOAL STATUS: HCPCS

## 2018-12-08 PROCEDURE — 2100000000 HC CCU R&B

## 2018-12-08 PROCEDURE — 99232 SBSQ HOSP IP/OBS MODERATE 35: CPT | Performed by: THORACIC SURGERY (CARDIOTHORACIC VASCULAR SURGERY)

## 2018-12-08 PROCEDURE — 85027 COMPLETE CBC AUTOMATED: CPT

## 2018-12-08 PROCEDURE — 71045 X-RAY EXAM CHEST 1 VIEW: CPT

## 2018-12-08 PROCEDURE — 2580000003 HC RX 258: Performed by: PHYSICIAN ASSISTANT

## 2018-12-08 PROCEDURE — G8988 SELF CARE GOAL STATUS: HCPCS

## 2018-12-08 PROCEDURE — 6370000000 HC RX 637 (ALT 250 FOR IP): Performed by: NURSE PRACTITIONER

## 2018-12-08 PROCEDURE — 6360000002 HC RX W HCPCS: Performed by: PHYSICIAN ASSISTANT

## 2018-12-08 PROCEDURE — 97116 GAIT TRAINING THERAPY: CPT

## 2018-12-08 PROCEDURE — 97530 THERAPEUTIC ACTIVITIES: CPT

## 2018-12-08 PROCEDURE — 6370000000 HC RX 637 (ALT 250 FOR IP): Performed by: PHYSICIAN ASSISTANT

## 2018-12-08 PROCEDURE — 82947 ASSAY GLUCOSE BLOOD QUANT: CPT

## 2018-12-08 PROCEDURE — G8978 MOBILITY CURRENT STATUS: HCPCS

## 2018-12-08 RX ORDER — FUROSEMIDE 10 MG/ML
20 INJECTION INTRAMUSCULAR; INTRAVENOUS ONCE
Status: COMPLETED | OUTPATIENT
Start: 2018-12-08 | End: 2018-12-08

## 2018-12-08 RX ORDER — CYCLOBENZAPRINE HCL 10 MG
10 TABLET ORAL 3 TIMES DAILY PRN
Status: DISCONTINUED | OUTPATIENT
Start: 2018-12-08 | End: 2018-12-11 | Stop reason: HOSPADM

## 2018-12-08 RX ORDER — ACETAMINOPHEN 10 MG/ML
1000 INJECTION, SOLUTION INTRAVENOUS ONCE
Status: COMPLETED | OUTPATIENT
Start: 2018-12-08 | End: 2018-12-08

## 2018-12-08 RX ADMIN — DEXTROSE MONOHYDRATE 2 G: 50 INJECTION, SOLUTION INTRAVENOUS at 03:56

## 2018-12-08 RX ADMIN — CHLORHEXIDINE GLUCONATE 15 ML: 1.2 RINSE ORAL at 20:47

## 2018-12-08 RX ADMIN — CLOPIDOGREL 75 MG: 75 TABLET, FILM COATED ORAL at 08:33

## 2018-12-08 RX ADMIN — CYCLOBENZAPRINE HYDROCHLORIDE 10 MG: 10 TABLET, FILM COATED ORAL at 02:50

## 2018-12-08 RX ADMIN — MUPIROCIN: 20 OINTMENT TOPICAL at 20:48

## 2018-12-08 RX ADMIN — KETOROLAC TROMETHAMINE 30 MG: 30 INJECTION, SOLUTION INTRAMUSCULAR at 12:50

## 2018-12-08 RX ADMIN — DOCUSATE SODIUM 100 MG: 100 CAPSULE, LIQUID FILLED ORAL at 19:48

## 2018-12-08 RX ADMIN — ATORVASTATIN CALCIUM 40 MG: 40 TABLET, FILM COATED ORAL at 19:48

## 2018-12-08 RX ADMIN — OXYCODONE AND ACETAMINOPHEN 2 TABLET: 5; 325 TABLET ORAL at 14:07

## 2018-12-08 RX ADMIN — MULTIPLE VITAMINS W/ MINERALS TAB 1 TABLET: TAB at 08:33

## 2018-12-08 RX ADMIN — ENOXAPARIN SODIUM 40 MG: 40 INJECTION SUBCUTANEOUS at 08:33

## 2018-12-08 RX ADMIN — DOCUSATE SODIUM 100 MG: 100 CAPSULE, LIQUID FILLED ORAL at 08:33

## 2018-12-08 RX ADMIN — Medication 10 ML: at 08:34

## 2018-12-08 RX ADMIN — OXYCODONE AND ACETAMINOPHEN 2 TABLET: 5; 325 TABLET ORAL at 09:31

## 2018-12-08 RX ADMIN — FUROSEMIDE 20 MG: 10 INJECTION, SOLUTION INTRAMUSCULAR; INTRAVENOUS at 10:29

## 2018-12-08 RX ADMIN — OXYCODONE AND ACETAMINOPHEN 1 TABLET: 5; 325 TABLET ORAL at 01:17

## 2018-12-08 RX ADMIN — DEXTROSE MONOHYDRATE 2 G: 50 INJECTION, SOLUTION INTRAVENOUS at 19:41

## 2018-12-08 RX ADMIN — OXYCODONE AND ACETAMINOPHEN 2 TABLET: 5; 325 TABLET ORAL at 05:12

## 2018-12-08 RX ADMIN — FAMOTIDINE 20 MG: 10 INJECTION, SOLUTION INTRAVENOUS at 19:41

## 2018-12-08 RX ADMIN — FAMOTIDINE 20 MG: 10 INJECTION, SOLUTION INTRAVENOUS at 08:33

## 2018-12-08 RX ADMIN — KETOROLAC TROMETHAMINE 30 MG: 30 INJECTION, SOLUTION INTRAMUSCULAR at 19:34

## 2018-12-08 RX ADMIN — LEVOTHYROXINE SODIUM 200 MCG: 100 TABLET ORAL at 05:12

## 2018-12-08 RX ADMIN — KETOROLAC TROMETHAMINE 30 MG: 30 INJECTION, SOLUTION INTRAMUSCULAR at 03:56

## 2018-12-08 RX ADMIN — MUPIROCIN: 20 OINTMENT TOPICAL at 08:34

## 2018-12-08 RX ADMIN — ASPIRIN 81 MG: 81 TABLET, COATED ORAL at 08:33

## 2018-12-08 RX ADMIN — VENLAFAXINE HYDROCHLORIDE 75 MG: 75 CAPSULE, EXTENDED RELEASE ORAL at 08:33

## 2018-12-08 RX ADMIN — DEXTROSE MONOHYDRATE 2 G: 50 INJECTION, SOLUTION INTRAVENOUS at 12:50

## 2018-12-08 RX ADMIN — ACETAMINOPHEN 1000 MG: 10 INJECTION, SOLUTION INTRAVENOUS at 10:26

## 2018-12-08 RX ADMIN — DIPHENHYDRAMINE HCL 25 MG: 25 TABLET ORAL at 20:46

## 2018-12-08 RX ADMIN — Medication 10 ML: at 19:34

## 2018-12-08 ASSESSMENT — PAIN DESCRIPTION - PAIN TYPE
TYPE: SURGICAL PAIN
TYPE: ACUTE PAIN;SURGICAL PAIN
TYPE: SURGICAL PAIN
TYPE: ACUTE PAIN;SURGICAL PAIN

## 2018-12-08 ASSESSMENT — PAIN SCALES - GENERAL
PAINLEVEL_OUTOF10: 7
PAINLEVEL_OUTOF10: 5
PAINLEVEL_OUTOF10: 5
PAINLEVEL_OUTOF10: 6
PAINLEVEL_OUTOF10: 7
PAINLEVEL_OUTOF10: 5
PAINLEVEL_OUTOF10: 10
PAINLEVEL_OUTOF10: 4
PAINLEVEL_OUTOF10: 1
PAINLEVEL_OUTOF10: 7
PAINLEVEL_OUTOF10: 9
PAINLEVEL_OUTOF10: 9
PAINLEVEL_OUTOF10: 5
PAINLEVEL_OUTOF10: 6

## 2018-12-08 ASSESSMENT — PAIN DESCRIPTION - LOCATION
LOCATION: CHEST
LOCATION: CHEST
LOCATION: CHEST;BACK;SHOULDER
LOCATION: CHEST
LOCATION: STERNUM;CHEST
LOCATION: CHEST

## 2018-12-08 ASSESSMENT — PAIN DESCRIPTION - ONSET: ONSET: ON-GOING

## 2018-12-08 ASSESSMENT — PAIN DESCRIPTION - DESCRIPTORS
DESCRIPTORS: ACHING;DISCOMFORT;DULL
DESCRIPTORS: ACHING

## 2018-12-08 ASSESSMENT — PAIN DESCRIPTION - FREQUENCY
FREQUENCY: CONTINUOUS
FREQUENCY: CONTINUOUS

## 2018-12-08 NOTE — PROGRESS NOTES
Device: No device (hand held assist/2 assist )  Activity:  (bed to recliner )  Assist Level: Minimal assistance (x2 for safety/balance )  Functional Mobility Comments: verbal instruction/tactile assist for upright posture, weight shifting, and awareness/assist with all lines   Toilet Transfers  Toilet Transfers Comments: N/T and pt with no toileting needs during eval   ADL  Feeding: Setup  Grooming: Setup;Minimal assistance (seated )  UE Bathing: Setup;Minimal assistance  LE Bathing: Maximum assistance  UE Dressing: Setup; Moderate assistance (with hosp gown and max assist for heart hugger )  LE Dressing: Maximum assistance  Toileting: Maximum assistance  Tone RUE  RUE Tone: Normotonic  Tone LUE  LUE Tone: Normotonic     Bed mobility  Supine to Sit: Moderate assistance (x2 )  Sit to Supine: Unable to assess (pt agreed to sit up in chair )  Comment: verbal instruction/assist for proper heart hugger use, hand placement and proper log rolling/pursed lip breathing   Transfers  Stand Pivot Transfers: Minimal assistance (x2 for safety/balance )  Sit to stand: Minimal assistance (x2)  Stand to sit: Minimal assistance (x2)  Transfer Comments: verbal instruction/tactile assist and demo for proper heart hugger use, hand placement, nose over toes, upright posture, weight shifting/postural control and awareness/assist with all lines   Vision - Basic Assessment  Prior Vision: Wears glasses all the time  Vision Comments: Pt denies visual changes   Cognition  Overall Cognitive Status: Exceptions  Arousal/Alertness: Delayed responses to stimuli  Following Commands: Follows one step commands with increased time; Follows one step commands with repetition  Attention Span: Attends with cues to redirect  Memory: Decreased short term memory  Safety Judgement: Decreased awareness of need for safety;Decreased awareness of need for assistance  Problem Solving: Decreased awareness of errors;Assistance required to identify errors

## 2018-12-08 NOTE — PROGRESS NOTES
with aspirin, atorvastatin, clopidogrel and metoprolol  2. Incentive spirometry  3. Ambulate as tolerated. Please see orders. Discussed with patient and nursing.     Caitlin Esquivel MD

## 2018-12-08 NOTE — PROGRESS NOTES
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   Cognition        Objective     Observation/Palpation  Posture: Fair (Slight trunk flexed posture due to sternal region post op pain)    AROM RLE (degrees)  RLE AROM: WNL  AROM LLE (degrees)  LLE AROM : WNL  AROM RUE (degrees)  RUE General AROM: See4 OT eval for B UE ROM  Strength RLE  Strength RLE: WFL  Comment: R hip flex 3+/5, l 3/5 due to chest area pain w/ resistance to this mx./ Otherwise B LE#'s 4+/6,ankles 5/5  Strength LLE  Strength LLE: WFL  Strength RUE  Comment: See OT eval for B UE MMT  Tone RLE  RLE Tone: Normotonic  Tone LLE  LLE Tone: Normotonic  Motor Control  Gross Motor?: WNL  Sensation  Overall Sensation Status: WNL  Bed mobility  Rolling to Right: Minimal assistance  Supine to Sit: Moderate assistance (x2)  Scooting: Moderate assistance (x2)  Transfers  Sit to Stand: Minimal Assistance (x2 to manage lines/tubes and A pt.)  Stand to sit: Minimal Assistance  Stand Pivot Transfers: Minimal Assistance  Ambulation  Ambulation?: Yes  Ambulation 1  Surface: level tile  Device: No Device  Assistance: Minimal assistance (x2)  Distance: 3 steps to chair  Stairs/Curb  Stairs?: No     Balance  Posture: Fair  Sitting - Static: Good  Sitting - Dynamic: Good  Standing - Static: Good;-  Standing - Dynamic: Fair;+        Assessment   Body structures, Functions, Activity limitations: Decreased functional mobility ; Decreased endurance;Decreased strength;Decreased balance  Prognosis: Good  Decision Making: Low Complexity  REQUIRES PT FOLLOW UP: Yes  Activity Tolerance  Activity Tolerance: Patient limited by endurance; Patient limited by pain         Plan   Plan  Times per week: 2x/day,7days/week  Current Treatment Recommendations: Strengthening, Functional Mobility Training, Transfer Training, Balance Training, Endurance Training, Gait Training, Home Exercise Program  Safety Devices  Type of devices: Left in chair, Nurse notified, Call light within reach  Restraints  Initially in place: No    G-Code  PT G-Codes  Functional Assessment Tool Used: KFO  Score: 14  Functional Limitation: Mobility: Walking and moving around  Mobility: Walking and Moving Around Current Status (): At least 60 percent but less than 80 percent impaired, limited or restricted  Mobility: Walking and Moving Around Goal Status (): 0 percent impaired, limited or restricted  OutComes Score                                           AM-PAC Score  AM-PAC Inpatient Mobility Raw Score : 14  AM-PAC Inpatient T-Scale Score : 38.1  Mobility Inpatient CMS 0-100% Score: 61.29  Mobility Inpatient CMS G-Code Modifier : CL          Goals  Short term goals  Time Frame for Short term goals: 12 treatments  Short term goal 1: Bed mobility/transfers independent  Short term goal 2:  Independent ambulation appropriate assistive device 200' x 1  Short term goal 3: Tolerate 30 min ther act  Short term goal 4: 5/5 B LE strength  Short term goal 5: Good standing balance  Patient Goals   Patient goals : Back to normal activity       Therapy Time   Individual Concurrent Group Co-treatment   Time In 1231         Time Out 1301         Minutes [address] [place] [address]

## 2018-12-08 NOTE — FLOWSHEET NOTE
12/08/18 0221   Provider Notification   Reason for Communication Review case;Evaluate  (back, neck and shoulder pain)   Provider Name Frida Ribeiro   Provider Notification Nurse Practitioner   Method of Communication Call   Response See orders   NAPOLEON Landry NP notified of patient c/o pain. Too early for patient to take percocet and toradol.   Order for flexeril was obtained

## 2018-12-08 NOTE — FLOWSHEET NOTE
Patient had open heart surgery. Patient was sitting up in a bed side chair. Patient states good family support, yet children are [place] the [place]. Patient brother walked in a bit later. Patient shares about some of the struggles with her step family. Patient states she is treated well.  shares in presence, listening, prayers. Follow up as needed. 12/08/18 1524   Encounter Summary   Services provided to: Patient   Referral/Consult From: Rounding;Nurse   Support System Family members; Children   Continue Visiting (12-8-18)   Complexity of Encounter Moderate   Length of Encounter 15 minutes   Spiritual Assessment Completed Yes   Routine   Type Follow up   Assessment Passive   Intervention Explored feelings, thoughts, concerns;Prayer; Active listening;Sustaining presence/ Ministry of presence; Discussed illness/injury and it's impact; Discussed belief system/L[employer] day practices/simon   Outcome Expressed gratitude;Receptive

## 2018-12-08 NOTE — PROGRESS NOTES
Occupational Therapy  DATE: 2018    NAME: Edgard Marlow  MRN: [id number]   : 1960    Patient not seen this date for Occupational Therapy due to:  [] Blood transfusion in progress  [x] Cancel by RN- hold eval per RN Juan R Barajas as Wilmer Medal cath needs pulled out before movement/tx and check back later   [] Hemodialysis  []  Refusal by Patient   [] Spine Precautions   [] Strict Bedrest  [] Surgery  [] Testing      [] Other        [] PT being discontinued at this time. Patient independent. No further needs. [] PT being discontinued at this time as the patient has been transferred to hospice care. No further needs.     Ab Cortes, OT

## 2018-12-09 ENCOUNTER — APPOINTMENT (OUTPATIENT)
Dept: GENERAL RADIOLOGY | Age: 58
DRG: 234 | End: 2018-12-09
Attending: INTERNAL MEDICINE
Payer: COMMERCIAL

## 2018-12-09 LAB
ANION GAP SERPL CALCULATED.3IONS-SCNC: 8 MMOL/L (ref 9–17)
BUN BLDV-MCNC: 15 MG/DL (ref 6–20)
BUN/CREAT BLD: 19 (ref 9–20)
CALCIUM SERPL-MCNC: 8.4 MG/DL (ref 8.6–10.4)
CHLORIDE BLD-SCNC: 107 MMOL/L (ref 98–107)
CO2: 26 MMOL/L (ref 20–31)
CREAT SERPL-MCNC: 0.78 MG/DL (ref 0.5–0.9)
GFR AFRICAN AMERICAN: >60 ML/MIN
GFR NON-AFRICAN AMERICAN: >60 ML/MIN
GFR SERPL CREATININE-BSD FRML MDRD: ABNORMAL ML/MIN/{1.73_M2}
GFR SERPL CREATININE-BSD FRML MDRD: ABNORMAL ML/MIN/{1.73_M2}
GLUCOSE BLD-MCNC: 103 MG/DL (ref 65–105)
GLUCOSE BLD-MCNC: 107 MG/DL (ref 70–99)
GLUCOSE BLD-MCNC: 115 MG/DL (ref 65–105)
GLUCOSE BLD-MCNC: 121 MG/DL (ref 65–105)
GLUCOSE BLD-MCNC: 99 MG/DL (ref 65–105)
HCT VFR BLD CALC: 26 % (ref 36–46)
HEMOGLOBIN: 8.9 G/DL (ref 12–16)
INR BLD: 1
MAGNESIUM: 2.1 MG/DL (ref 1.6–2.6)
MCH RBC QN AUTO: 28.3 PG (ref 26–34)
MCHC RBC AUTO-ENTMCNC: 34.2 G/DL (ref 31–37)
MCV RBC AUTO: 82.8 FL (ref 80–100)
NRBC AUTOMATED: ABNORMAL PER 100 WBC
PDW BLD-RTO: 14.4 % (ref 11.5–14.5)
PLATELET # BLD: 97 K/UL (ref 130–400)
PMV BLD AUTO: 9.8 FL (ref 6–12)
POTASSIUM SERPL-SCNC: 3.5 MMOL/L (ref 3.7–5.3)
PROTHROMBIN TIME: 10.7 SEC (ref 9.7–11.6)
RBC # BLD: 3.14 M/UL (ref 4–5.2)
SODIUM BLD-SCNC: 141 MMOL/L (ref 135–144)
WBC # BLD: 7.8 K/UL (ref 3.5–11)

## 2018-12-09 PROCEDURE — 85027 COMPLETE CBC AUTOMATED: CPT

## 2018-12-09 PROCEDURE — 94761 N-INVAS EAR/PLS OXIMETRY MLT: CPT

## 2018-12-09 PROCEDURE — 6370000000 HC RX 637 (ALT 250 FOR IP): Performed by: PHYSICIAN ASSISTANT

## 2018-12-09 PROCEDURE — 85610 PROTHROMBIN TIME: CPT

## 2018-12-09 PROCEDURE — 2000000000 HC ICU R&B

## 2018-12-09 PROCEDURE — 83735 ASSAY OF MAGNESIUM: CPT

## 2018-12-09 PROCEDURE — 80048 BASIC METABOLIC PNL TOTAL CA: CPT

## 2018-12-09 PROCEDURE — 2700000000 HC OXYGEN THERAPY PER DAY

## 2018-12-09 PROCEDURE — 71045 X-RAY EXAM CHEST 1 VIEW: CPT

## 2018-12-09 PROCEDURE — 99232 SBSQ HOSP IP/OBS MODERATE 35: CPT | Performed by: THORACIC SURGERY (CARDIOTHORACIC VASCULAR SURGERY)

## 2018-12-09 PROCEDURE — 36415 COLL VENOUS BLD VENIPUNCTURE: CPT

## 2018-12-09 PROCEDURE — 82947 ASSAY GLUCOSE BLOOD QUANT: CPT

## 2018-12-09 PROCEDURE — 6360000002 HC RX W HCPCS: Performed by: PHYSICIAN ASSISTANT

## 2018-12-09 PROCEDURE — 6370000000 HC RX 637 (ALT 250 FOR IP): Performed by: NURSE PRACTITIONER

## 2018-12-09 PROCEDURE — 97116 GAIT TRAINING THERAPY: CPT

## 2018-12-09 PROCEDURE — 2580000003 HC RX 258: Performed by: PHYSICIAN ASSISTANT

## 2018-12-09 PROCEDURE — 6360000002 HC RX W HCPCS: Performed by: NURSE PRACTITIONER

## 2018-12-09 RX ORDER — POTASSIUM CHLORIDE 20 MEQ/1
60 TABLET, EXTENDED RELEASE ORAL ONCE
Status: COMPLETED | OUTPATIENT
Start: 2018-12-09 | End: 2018-12-09

## 2018-12-09 RX ADMIN — MULTIPLE VITAMINS W/ MINERALS TAB 1 TABLET: TAB at 08:21

## 2018-12-09 RX ADMIN — ATORVASTATIN CALCIUM 40 MG: 40 TABLET, FILM COATED ORAL at 20:10

## 2018-12-09 RX ADMIN — KETOROLAC TROMETHAMINE 30 MG: 30 INJECTION, SOLUTION INTRAMUSCULAR at 16:14

## 2018-12-09 RX ADMIN — MUPIROCIN: 20 OINTMENT TOPICAL at 08:22

## 2018-12-09 RX ADMIN — FAMOTIDINE 20 MG: 20 TABLET ORAL at 20:10

## 2018-12-09 RX ADMIN — OXYCODONE AND ACETAMINOPHEN 2 TABLET: 5; 325 TABLET ORAL at 04:15

## 2018-12-09 RX ADMIN — DOCUSATE SODIUM 100 MG: 100 CAPSULE, LIQUID FILLED ORAL at 20:10

## 2018-12-09 RX ADMIN — METOPROLOL TARTRATE 25 MG: 25 TABLET, FILM COATED ORAL at 20:11

## 2018-12-09 RX ADMIN — VENLAFAXINE HYDROCHLORIDE 75 MG: 75 CAPSULE, EXTENDED RELEASE ORAL at 08:22

## 2018-12-09 RX ADMIN — FAMOTIDINE 20 MG: 20 TABLET ORAL at 08:21

## 2018-12-09 RX ADMIN — KETOROLAC TROMETHAMINE 30 MG: 30 INJECTION, SOLUTION INTRAMUSCULAR at 00:55

## 2018-12-09 RX ADMIN — POTASSIUM CHLORIDE 60 MEQ: 20 TABLET, EXTENDED RELEASE ORAL at 08:23

## 2018-12-09 RX ADMIN — DEXTROSE MONOHYDRATE 2 G: 50 INJECTION, SOLUTION INTRAVENOUS at 04:15

## 2018-12-09 RX ADMIN — DOCUSATE SODIUM 100 MG: 100 CAPSULE, LIQUID FILLED ORAL at 08:22

## 2018-12-09 RX ADMIN — LEVOTHYROXINE SODIUM 200 MCG: 100 TABLET ORAL at 07:09

## 2018-12-09 RX ADMIN — POLYETHYLENE GLYCOL 3350 17 G: 17 POWDER, FOR SOLUTION ORAL at 08:22

## 2018-12-09 RX ADMIN — ACETAMINOPHEN 650 MG: 325 TABLET ORAL at 20:10

## 2018-12-09 RX ADMIN — Medication 10 ML: at 22:08

## 2018-12-09 RX ADMIN — MUPIROCIN: 20 OINTMENT TOPICAL at 22:06

## 2018-12-09 RX ADMIN — CHLORHEXIDINE GLUCONATE 15 ML: 1.2 RINSE ORAL at 22:04

## 2018-12-09 RX ADMIN — DIPHENHYDRAMINE HCL 25 MG: 25 TABLET ORAL at 22:03

## 2018-12-09 RX ADMIN — CLOPIDOGREL 75 MG: 75 TABLET, FILM COATED ORAL at 08:22

## 2018-12-09 RX ADMIN — ASPIRIN 81 MG: 81 TABLET, COATED ORAL at 08:22

## 2018-12-09 RX ADMIN — KETOROLAC TROMETHAMINE 30 MG: 30 INJECTION, SOLUTION INTRAMUSCULAR at 07:09

## 2018-12-09 RX ADMIN — OXYCODONE AND ACETAMINOPHEN 1 TABLET: 5; 325 TABLET ORAL at 12:01

## 2018-12-09 RX ADMIN — METOPROLOL TARTRATE 25 MG: 25 TABLET, FILM COATED ORAL at 08:22

## 2018-12-09 RX ADMIN — Medication 10 ML: at 08:24

## 2018-12-09 ASSESSMENT — PAIN DESCRIPTION - ONSET: ONSET: ON-GOING

## 2018-12-09 ASSESSMENT — PAIN DESCRIPTION - LOCATION
LOCATION: CHEST
LOCATION: CHEST;BACK

## 2018-12-09 ASSESSMENT — PAIN DESCRIPTION - ORIENTATION
ORIENTATION: LEFT
ORIENTATION: MID

## 2018-12-09 ASSESSMENT — PAIN SCALES - GENERAL
PAINLEVEL_OUTOF10: 7
PAINLEVEL_OUTOF10: 4
PAINLEVEL_OUTOF10: 4
PAINLEVEL_OUTOF10: 5
PAINLEVEL_OUTOF10: 6
PAINLEVEL_OUTOF10: 4
PAINLEVEL_OUTOF10: 3
PAINLEVEL_OUTOF10: 4
PAINLEVEL_OUTOF10: 3
PAINLEVEL_OUTOF10: 7

## 2018-12-09 ASSESSMENT — PAIN DESCRIPTION - FREQUENCY: FREQUENCY: CONTINUOUS

## 2018-12-09 ASSESSMENT — PAIN DESCRIPTION - PAIN TYPE
TYPE: SURGICAL PAIN
TYPE: ACUTE PAIN;SURGICAL PAIN

## 2018-12-09 ASSESSMENT — PAIN DESCRIPTION - DESCRIPTORS: DESCRIPTORS: ACHING;DISCOMFORT;DULL

## 2018-12-09 NOTE — PROGRESS NOTES
 famotidine  20 mg Oral BID    metoprolol tartrate  25 mg Oral BID    chlorhexidine  15 mL Mouth/Throat BID    mupirocin   Nasal BID    therapeutic multivitamin-minerals  1 tablet Oral Daily with breakfast    atorvastatin  40 mg Oral Nightly    enoxaparin  40 mg Subcutaneous Daily    aspirin  81 mg Oral Daily    clopidogrel  75 mg Oral Daily    insulin lispro  0-12 Units Subcutaneous TID WC    insulin lispro  0-6 Units Subcutaneous Nightly       IV Infusions (if any):   insulin (HUMAN R) non-weight based infusion Stopped (12/07/18 2008)    dextrose      norepinephrine Stopped (12/08/18 0934)       Diagnostics:   Telemetry: Sinus  EKG: normal EKG, normal sinus rhythm, unchanged from previous tracings. Labs:   CBC:   Recent Labs      12/08/18   0445  12/09/18   0512   WBC  7.8  7.8   HGB  9.1*  8.9*   HCT  27.1*  26.0*   PLT  117*  97*     BMP:   Recent Labs      12/08/18   0445  12/09/18   0512   NA  147*  141   K  4.1  3.5*   CO2  23  26   BUN  15  15   CREATININE  0.94*  0.78   LABGLOM  >60  >60   GLUCOSE  161*  107*     BNP: No results for input(s): BNP in the last 72 hours. PT/INR:   Recent Labs      12/08/18   0445  12/09/18   0512   PROTIME  11.9*  10.7   INR  1.2  1.0     APTT:  Recent Labs      12/07/18   1800  12/07/18   1900   APTT  21.0*  23.8     CARDIAC ENZYMES:No results for input(s): CKTOTAL, CKMB, CKMBINDEX, TROPONINI in the last 72 hours. FASTING LIPID PANEL:No results found for: HDL, LDLDIRECT, LDLCALC, TRIG  LIVER PROFILE:No results for input(s): AST, ALT, LABALBU in the last 72 hours. ASSESSMENT:    Patient Active Problem List   Diagnosis    Chest pain    CAD in native artery       PLAN:    1. Patient is doing well post cabg. Will continue aggressive medical therapy for secondary prevention of cad with aspirin, atorvastatin, clopidogrel and metoprolol  2. Incentive spirometry  3. Ambulate as tolerated. Please see orders.   Discussed with patient and nursing.     John Mario MD

## 2018-12-10 ENCOUNTER — APPOINTMENT (OUTPATIENT)
Dept: GENERAL RADIOLOGY | Age: 58
DRG: 234 | End: 2018-12-10
Attending: INTERNAL MEDICINE
Payer: COMMERCIAL

## 2018-12-10 LAB
ABO/RH: NORMAL
ANION GAP SERPL CALCULATED.3IONS-SCNC: 12 MMOL/L (ref 9–17)
ANTIBODY SCREEN: NEGATIVE
ARM BAND NUMBER: NORMAL
BLD PROD TYP BPU: NORMAL
BUN BLDV-MCNC: 19 MG/DL (ref 6–20)
BUN/CREAT BLD: 20 (ref 9–20)
CALCIUM SERPL-MCNC: 8.5 MG/DL (ref 8.6–10.4)
CHLORIDE BLD-SCNC: 105 MMOL/L (ref 98–107)
CO2: 24 MMOL/L (ref 20–31)
CREAT SERPL-MCNC: 0.95 MG/DL (ref 0.5–0.9)
CROSSMATCH RESULT: NORMAL
CROSSMATCH RESULT: NORMAL
DISPENSE STATUS BLOOD BANK: NORMAL
EXPIRATION DATE: NORMAL
GFR AFRICAN AMERICAN: >60 ML/MIN
GFR NON-AFRICAN AMERICAN: >60 ML/MIN
GFR SERPL CREATININE-BSD FRML MDRD: ABNORMAL ML/MIN/{1.73_M2}
GFR SERPL CREATININE-BSD FRML MDRD: ABNORMAL ML/MIN/{1.73_M2}
GLUCOSE BLD-MCNC: 100 MG/DL (ref 70–99)
GLUCOSE BLD-MCNC: 101 MG/DL (ref 65–105)
GLUCOSE BLD-MCNC: 125 MG/DL (ref 65–105)
GLUCOSE BLD-MCNC: 152 MG/DL (ref 65–105)
GLUCOSE BLD-MCNC: 88 MG/DL (ref 65–105)
HCT VFR BLD CALC: 24.9 % (ref 36–46)
HEMOGLOBIN: 8.5 G/DL (ref 12–16)
INR BLD: 1
MAGNESIUM: 2.2 MG/DL (ref 1.6–2.6)
MCH RBC QN AUTO: 28.1 PG (ref 26–34)
MCHC RBC AUTO-ENTMCNC: 34 G/DL (ref 31–37)
MCV RBC AUTO: 82.6 FL (ref 80–100)
NRBC AUTOMATED: ABNORMAL PER 100 WBC
PDW BLD-RTO: 14.4 % (ref 11.5–14.5)
PLATELET # BLD: 106 K/UL (ref 130–400)
PMV BLD AUTO: 9.8 FL (ref 6–12)
POTASSIUM SERPL-SCNC: 4 MMOL/L (ref 3.7–5.3)
PROTHROMBIN TIME: 10.5 SEC (ref 9.7–11.6)
RBC # BLD: 3.02 M/UL (ref 4–5.2)
SODIUM BLD-SCNC: 141 MMOL/L (ref 135–144)
TRANSFUSION STATUS: NORMAL
UNIT DIVISION: 0
UNIT NUMBER: NORMAL
WBC # BLD: 8.1 K/UL (ref 3.5–11)

## 2018-12-10 PROCEDURE — 97535 SELF CARE MNGMENT TRAINING: CPT | Performed by: NURSE PRACTITIONER

## 2018-12-10 PROCEDURE — 83735 ASSAY OF MAGNESIUM: CPT

## 2018-12-10 PROCEDURE — 6360000002 HC RX W HCPCS: Performed by: NURSE PRACTITIONER

## 2018-12-10 PROCEDURE — 82947 ASSAY GLUCOSE BLOOD QUANT: CPT

## 2018-12-10 PROCEDURE — 85610 PROTHROMBIN TIME: CPT

## 2018-12-10 PROCEDURE — 2580000003 HC RX 258: Performed by: PHYSICIAN ASSISTANT

## 2018-12-10 PROCEDURE — 6370000000 HC RX 637 (ALT 250 FOR IP): Performed by: NURSE PRACTITIONER

## 2018-12-10 PROCEDURE — 6360000002 HC RX W HCPCS: Performed by: PHYSICIAN ASSISTANT

## 2018-12-10 PROCEDURE — 97116 GAIT TRAINING THERAPY: CPT

## 2018-12-10 PROCEDURE — 71045 X-RAY EXAM CHEST 1 VIEW: CPT

## 2018-12-10 PROCEDURE — 85027 COMPLETE CBC AUTOMATED: CPT

## 2018-12-10 PROCEDURE — 6370000000 HC RX 637 (ALT 250 FOR IP): Performed by: PHYSICIAN ASSISTANT

## 2018-12-10 PROCEDURE — 36415 COLL VENOUS BLD VENIPUNCTURE: CPT

## 2018-12-10 PROCEDURE — 80048 BASIC METABOLIC PNL TOTAL CA: CPT

## 2018-12-10 PROCEDURE — 97530 THERAPEUTIC ACTIVITIES: CPT | Performed by: NURSE PRACTITIONER

## 2018-12-10 PROCEDURE — 2060000000 HC ICU INTERMEDIATE R&B

## 2018-12-10 PROCEDURE — 97530 THERAPEUTIC ACTIVITIES: CPT

## 2018-12-10 RX ADMIN — VENLAFAXINE HYDROCHLORIDE 75 MG: 75 CAPSULE, EXTENDED RELEASE ORAL at 09:25

## 2018-12-10 RX ADMIN — CLOPIDOGREL 75 MG: 75 TABLET, FILM COATED ORAL at 09:24

## 2018-12-10 RX ADMIN — DOCUSATE SODIUM 100 MG: 100 CAPSULE, LIQUID FILLED ORAL at 09:24

## 2018-12-10 RX ADMIN — OXYCODONE AND ACETAMINOPHEN 1 TABLET: 5; 325 TABLET ORAL at 18:42

## 2018-12-10 RX ADMIN — FAMOTIDINE 20 MG: 20 TABLET ORAL at 09:24

## 2018-12-10 RX ADMIN — ENOXAPARIN SODIUM 40 MG: 40 INJECTION SUBCUTANEOUS at 09:25

## 2018-12-10 RX ADMIN — KETOROLAC TROMETHAMINE 30 MG: 30 INJECTION, SOLUTION INTRAMUSCULAR at 20:46

## 2018-12-10 RX ADMIN — OXYCODONE AND ACETAMINOPHEN 1 TABLET: 5; 325 TABLET ORAL at 14:36

## 2018-12-10 RX ADMIN — METOPROLOL TARTRATE 12.5 MG: 25 TABLET ORAL at 20:47

## 2018-12-10 RX ADMIN — ASPIRIN 81 MG: 81 TABLET, COATED ORAL at 09:24

## 2018-12-10 RX ADMIN — MUPIROCIN: 20 OINTMENT TOPICAL at 21:00

## 2018-12-10 RX ADMIN — KETOROLAC TROMETHAMINE 30 MG: 30 INJECTION, SOLUTION INTRAMUSCULAR at 01:08

## 2018-12-10 RX ADMIN — CHLORHEXIDINE GLUCONATE 15 ML: 1.2 RINSE ORAL at 09:27

## 2018-12-10 RX ADMIN — OXYCODONE AND ACETAMINOPHEN 1 TABLET: 5; 325 TABLET ORAL at 09:58

## 2018-12-10 RX ADMIN — ATORVASTATIN CALCIUM 40 MG: 40 TABLET, FILM COATED ORAL at 20:46

## 2018-12-10 RX ADMIN — Medication 10 ML: at 09:25

## 2018-12-10 RX ADMIN — FAMOTIDINE 20 MG: 20 TABLET ORAL at 20:46

## 2018-12-10 RX ADMIN — DOCUSATE SODIUM 100 MG: 100 CAPSULE, LIQUID FILLED ORAL at 20:46

## 2018-12-10 RX ADMIN — LEVOTHYROXINE SODIUM 200 MCG: 100 TABLET ORAL at 08:10

## 2018-12-10 RX ADMIN — MULTIPLE VITAMINS W/ MINERALS TAB 1 TABLET: TAB at 09:25

## 2018-12-10 RX ADMIN — Medication 10 ML: at 21:00

## 2018-12-10 ASSESSMENT — PAIN SCALES - GENERAL
PAINLEVEL_OUTOF10: 6
PAINLEVEL_OUTOF10: 5
PAINLEVEL_OUTOF10: 6
PAINLEVEL_OUTOF10: 8
PAINLEVEL_OUTOF10: 4
PAINLEVEL_OUTOF10: 6
PAINLEVEL_OUTOF10: 0
PAINLEVEL_OUTOF10: 2
PAINLEVEL_OUTOF10: 5

## 2018-12-10 ASSESSMENT — PAIN DESCRIPTION - DESCRIPTORS
DESCRIPTORS: DISCOMFORT
DESCRIPTORS: ACHING

## 2018-12-10 ASSESSMENT — PAIN DESCRIPTION - ONSET
ONSET: PROGRESSIVE
ONSET: PROGRESSIVE

## 2018-12-10 ASSESSMENT — PAIN DESCRIPTION - PROGRESSION
CLINICAL_PROGRESSION: GRADUALLY WORSENING
CLINICAL_PROGRESSION: GRADUALLY WORSENING

## 2018-12-10 ASSESSMENT — PAIN DESCRIPTION - LOCATION
LOCATION: STERNUM;SHOULDER
LOCATION: CHEST
LOCATION: STERNUM;SHOULDER
LOCATION: STERNUM
LOCATION: CHEST
LOCATION: STERNUM
LOCATION: STERNUM

## 2018-12-10 ASSESSMENT — PAIN DESCRIPTION - PAIN TYPE
TYPE: SURGICAL PAIN

## 2018-12-10 ASSESSMENT — PAIN DESCRIPTION - ORIENTATION
ORIENTATION: ANTERIOR
ORIENTATION: ANTERIOR

## 2018-12-10 NOTE — FLOWSHEET NOTE
Patient sitting in chair;  seated nearby. Patient states she is \"doing okay\"; expresses feeling overwhelmed by surgery and long recovery ahead; expresses positive attitude and desire to get well. Patient's  appears to be supportive. Writer provides listening presence and supportive conversation. Patient and  express gratitude for visit. Spiritual Care will follow as needed. 12/10/18 1134   Encounter Summary   Services provided to: Patient and family together   Referral/Consult From: Rounding   Continue Visiting (12/10/18)   Complexity of Encounter Moderate   Length of Encounter 15 minutes   Routine   Type Follow up   Assessment Calm; Approachable   Intervention Active listening;Explored feelings, thoughts, concerns   Outcome Expressed gratitude;Engaged in conversation

## 2018-12-10 NOTE — PROGRESS NOTES
with functional mobility in paz. Multiple standing extended standing rest breaks with VC for pursed lip breathing and coordinating breathing with steps. Pt returning to room and sitting in chair ~10 mins, SpO2 WNL. Pt becoming SOB eith conversation. Safety Devices  Safety Devices in place: Yes  Type of devices: Call light within reach; Left in chair;Patient at risk for falls;Gait belt;Nurse notified; All fall risk precautions in place          Plan   Plan  Times per week: 4-6x/week 1-2x/day as gigi   Current Treatment Recommendations: Strengthening, Balance Training, Functional Mobility Training, Neuromuscular Re-education, Patient/Caregiver Education & Training, Endurance Training, Self-Care / ADL, Home Management Training, Pain Management, Safety Education & Training, Equipment Evaluation, Education, & procurement    AM-PAC Score        AM-PAC Inpatient Daily Activity Raw Score: 16  AM-PAC Inpatient ADL T-Scale Score : 35.96  ADL Inpatient CMS 0-100% Score: 53.32  ADL Inpatient CMS G-Code Modifier : CK    Goals  Short term goals  Time Frame for Short term goals: by discharge, pt to demo   Short term goal 1: bed mob tasks with use of rail as needed to min assist.    Short term goal 2: UB ADL to set up and LB ADL to min assist with use of AE/AD as needed. Short term goal 3: toileting tasks with use of grab bar as needed/AD to min assist.   Short term goal 4: ADL transfers and functional mob with AD as needed to SBA. Short term goal 5: balance to fair with AD as needed and gigi > 5 mins to reduce fall risk. Long term goals  Long term goal 1: Pt to be I with cardiac BUE HEP with hand outs as needed to maintain UE functional use. Patient Goals   Patient goals : Get rid of some this pain, walk better, and get home!        Therapy Time   Individual Concurrent Group Co-treatment   Time In 0802         Time Out 0856         Minutes [address] [place]

## 2018-12-11 ENCOUNTER — APPOINTMENT (OUTPATIENT)
Dept: GENERAL RADIOLOGY | Age: 58
DRG: 234 | End: 2018-12-11
Attending: INTERNAL MEDICINE
Payer: COMMERCIAL

## 2018-12-11 VITALS
TEMPERATURE: 97.7 F | BODY MASS INDEX: 40.22 KG/M2 | RESPIRATION RATE: 20 BRPM | OXYGEN SATURATION: 94 % | HEART RATE: 116 BPM | WEIGHT: 227 LBS | SYSTOLIC BLOOD PRESSURE: 125 MMHG | HEIGHT: 63 IN | DIASTOLIC BLOOD PRESSURE: 60 MMHG

## 2018-12-11 LAB
ANION GAP SERPL CALCULATED.3IONS-SCNC: 14 MMOL/L (ref 9–17)
BUN BLDV-MCNC: 17 MG/DL (ref 6–20)
BUN/CREAT BLD: 18 (ref 9–20)
CALCIUM SERPL-MCNC: 8.1 MG/DL (ref 8.6–10.4)
CHLORIDE BLD-SCNC: 100 MMOL/L (ref 98–107)
CO2: 24 MMOL/L (ref 20–31)
CREAT SERPL-MCNC: 0.94 MG/DL (ref 0.5–0.9)
GFR AFRICAN AMERICAN: >60 ML/MIN
GFR NON-AFRICAN AMERICAN: >60 ML/MIN
GFR SERPL CREATININE-BSD FRML MDRD: ABNORMAL ML/MIN/{1.73_M2}
GFR SERPL CREATININE-BSD FRML MDRD: ABNORMAL ML/MIN/{1.73_M2}
GLUCOSE BLD-MCNC: 105 MG/DL (ref 65–105)
GLUCOSE BLD-MCNC: 127 MG/DL (ref 65–105)
GLUCOSE BLD-MCNC: 93 MG/DL (ref 65–105)
GLUCOSE BLD-MCNC: 97 MG/DL (ref 70–99)
HCT VFR BLD CALC: 24.9 % (ref 36–46)
HEMOGLOBIN: 8.6 G/DL (ref 12–16)
INR BLD: 1
MAGNESIUM: 2.3 MG/DL (ref 1.6–2.6)
MCH RBC QN AUTO: 28.4 PG (ref 26–34)
MCHC RBC AUTO-ENTMCNC: 34.4 G/DL (ref 31–37)
MCV RBC AUTO: 82.5 FL (ref 80–100)
NRBC AUTOMATED: ABNORMAL PER 100 WBC
PDW BLD-RTO: 14.4 % (ref 11.5–14.5)
PLATELET # BLD: 142 K/UL (ref 130–400)
PMV BLD AUTO: 9.1 FL (ref 6–12)
POTASSIUM SERPL-SCNC: 4.1 MMOL/L (ref 3.7–5.3)
PROTHROMBIN TIME: 10.6 SEC (ref 9.7–11.6)
RBC # BLD: 3.02 M/UL (ref 4–5.2)
SODIUM BLD-SCNC: 138 MMOL/L (ref 135–144)
WBC # BLD: 6.6 K/UL (ref 3.5–11)

## 2018-12-11 PROCEDURE — 6370000000 HC RX 637 (ALT 250 FOR IP): Performed by: PHYSICIAN ASSISTANT

## 2018-12-11 PROCEDURE — 97116 GAIT TRAINING THERAPY: CPT

## 2018-12-11 PROCEDURE — 99024 POSTOP FOLLOW-UP VISIT: CPT | Performed by: NURSE PRACTITIONER

## 2018-12-11 PROCEDURE — 80048 BASIC METABOLIC PNL TOTAL CA: CPT

## 2018-12-11 PROCEDURE — 36415 COLL VENOUS BLD VENIPUNCTURE: CPT

## 2018-12-11 PROCEDURE — 6370000000 HC RX 637 (ALT 250 FOR IP): Performed by: NURSE PRACTITIONER

## 2018-12-11 PROCEDURE — 97530 THERAPEUTIC ACTIVITIES: CPT | Performed by: NURSE PRACTITIONER

## 2018-12-11 PROCEDURE — 85027 COMPLETE CBC AUTOMATED: CPT

## 2018-12-11 PROCEDURE — 85610 PROTHROMBIN TIME: CPT

## 2018-12-11 PROCEDURE — 97530 THERAPEUTIC ACTIVITIES: CPT

## 2018-12-11 PROCEDURE — 82947 ASSAY GLUCOSE BLOOD QUANT: CPT

## 2018-12-11 PROCEDURE — 6360000002 HC RX W HCPCS: Performed by: PHYSICIAN ASSISTANT

## 2018-12-11 PROCEDURE — 2580000003 HC RX 258: Performed by: PHYSICIAN ASSISTANT

## 2018-12-11 PROCEDURE — 83735 ASSAY OF MAGNESIUM: CPT

## 2018-12-11 PROCEDURE — 97535 SELF CARE MNGMENT TRAINING: CPT | Performed by: NURSE PRACTITIONER

## 2018-12-11 PROCEDURE — 71045 X-RAY EXAM CHEST 1 VIEW: CPT

## 2018-12-11 RX ORDER — CLOPIDOGREL BISULFATE 75 MG/1
75 TABLET ORAL DAILY
Qty: 30 TABLET | Refills: 3 | DISCHARGE
Start: 2018-12-12

## 2018-12-11 RX ORDER — PSEUDOEPHEDRINE HCL 30 MG
100 TABLET ORAL 2 TIMES DAILY
DISCHARGE
Start: 2018-12-11

## 2018-12-11 RX ORDER — OXYCODONE HYDROCHLORIDE AND ACETAMINOPHEN 5; 325 MG/1; MG/1
1 TABLET ORAL EVERY 6 HOURS PRN
Qty: 28 TABLET | Refills: 0 | Status: SHIPPED | OUTPATIENT
Start: 2018-12-11 | End: 2018-12-18

## 2018-12-11 RX ORDER — FAMOTIDINE 20 MG/1
20 TABLET, FILM COATED ORAL 2 TIMES DAILY
Qty: 60 TABLET | Refills: 3 | DISCHARGE
Start: 2018-12-11

## 2018-12-11 RX ORDER — M-VIT,TX,IRON,MINS/CALC/FOLIC 27MG-0.4MG
1 TABLET ORAL
DISCHARGE
Start: 2018-12-12

## 2018-12-11 RX ORDER — ATORVASTATIN CALCIUM 40 MG/1
40 TABLET, FILM COATED ORAL NIGHTLY
Qty: 30 TABLET | Refills: 3 | DISCHARGE
Start: 2018-12-11

## 2018-12-11 RX ADMIN — Medication 10 ML: at 09:00

## 2018-12-11 RX ADMIN — OXYCODONE AND ACETAMINOPHEN 2 TABLET: 5; 325 TABLET ORAL at 08:59

## 2018-12-11 RX ADMIN — LEVOTHYROXINE SODIUM 200 MCG: 100 TABLET ORAL at 09:08

## 2018-12-11 RX ADMIN — MULTIPLE VITAMINS W/ MINERALS TAB 1 TABLET: TAB at 08:59

## 2018-12-11 RX ADMIN — FAMOTIDINE 20 MG: 20 TABLET ORAL at 08:59

## 2018-12-11 RX ADMIN — VENLAFAXINE HYDROCHLORIDE 75 MG: 75 CAPSULE, EXTENDED RELEASE ORAL at 09:08

## 2018-12-11 RX ADMIN — METOPROLOL TARTRATE 12.5 MG: 25 TABLET ORAL at 09:09

## 2018-12-11 RX ADMIN — CLOPIDOGREL 75 MG: 75 TABLET, FILM COATED ORAL at 08:59

## 2018-12-11 RX ADMIN — OXYCODONE AND ACETAMINOPHEN 1 TABLET: 5; 325 TABLET ORAL at 01:32

## 2018-12-11 RX ADMIN — ASPIRIN 81 MG: 81 TABLET, COATED ORAL at 08:59

## 2018-12-11 RX ADMIN — OXYCODONE AND ACETAMINOPHEN 1 TABLET: 5; 325 TABLET ORAL at 18:39

## 2018-12-11 RX ADMIN — CHLORHEXIDINE GLUCONATE 15 ML: 1.2 RINSE ORAL at 09:05

## 2018-12-11 RX ADMIN — DOCUSATE SODIUM 100 MG: 100 CAPSULE, LIQUID FILLED ORAL at 08:59

## 2018-12-11 RX ADMIN — ENOXAPARIN SODIUM 40 MG: 40 INJECTION SUBCUTANEOUS at 08:59

## 2018-12-11 ASSESSMENT — PAIN SCALES - GENERAL
PAINLEVEL_OUTOF10: 7
PAINLEVEL_OUTOF10: 3
PAINLEVEL_OUTOF10: 6
PAINLEVEL_OUTOF10: 0

## 2018-12-11 ASSESSMENT — PAIN DESCRIPTION - LOCATION
LOCATION: STERNUM

## 2018-12-11 ASSESSMENT — PAIN DESCRIPTION - DESCRIPTORS
DESCRIPTORS: DISCOMFORT

## 2018-12-11 ASSESSMENT — PAIN DESCRIPTION - PAIN TYPE
TYPE: SURGICAL PAIN

## 2018-12-11 ASSESSMENT — PAIN DESCRIPTION - ORIENTATION: ORIENTATION: ANTERIOR

## 2018-12-11 NOTE — PROGRESS NOTES
strength;Decreased balance  Assessment: pt able to increase amb. distance with min SOB   Activity Tolerance  Activity Tolerance: Patient Tolerated treatment well     G-Code     OutComes Score                                                    AM-PAC Score  AM-PAC Inpatient Mobility Raw Score : 16  AM-PAC Inpatient T-Scale Score : 40.78  Mobility Inpatient CMS 0-100% Score: 54.16  Mobility Inpatient CMS G-Code Modifier : CK          Goals  Short term goals  Time Frame for Short term goals: 12 treatments  Short term goal 1: Bed mobility/transfers independent  Short term goal 2:  Independent ambulation appropriate assistive device 200' x 1  Short term goal 3: Tolerate 30 min ther act  Short term goal 4: 5/5 B LE strength  Short term goal 5: Good standing balance  Patient Goals   Patient goals : Back to normal activity    Plan    Plan  Times per week: 2x/day,7days/week  Current Treatment Recommendations: Strengthening, Functional Mobility Training, Transfer Training, Balance Training, Endurance Training, Gait Training, Home Exercise Program  Safety Devices  Type of devices: Call light within reach, Left in chair, Nurse notified, All fall risk precautions in place, Gait belt (Nursing with patient after therapy left)  Restraints  Initially in place: No     Therapy Time   Individual Concurrent Group Co-treatment   Time In  1550         Time Out  1616         Minutes  26                 [address], [place]

## 2018-12-11 NOTE — PROGRESS NOTES
Occupational Therapy  Facility/Department: [facility]  Daily Treatment Note  NAME: Roque Miranda  : 1960  MRN: [id number]    Date of Service: 2018    Discharge Recommendations:  [address]   Patient would benefit from SNF for continued occupational therapy to increase independence with  ADL of bathing, dressing, toileting and grooming. Writer recommending SNF placement for for activity tolerance and strength which will increase independence with ADL's coordinated with bed mobility and chair transfers. Continued skilled OT services to address decreased safety awareness with ADL and IADL tasks and for education and increased independence with DME and AE for fall prevention and ec/ws techniques prior to d/c home. OT Equipment Recommendations  ADL Assistive Devices: Reacher;Long-handled Shoe Horn;Long-handled Sponge;Grab Bars - shower; Shower Chair with back; Toileting - 3-in-1 Commode    Patient Diagnosis(es): There were no encounter diagnoses. has a past medical history of Allergic rhinitis; Anxiety; GERD (gastroesophageal reflux disease); Hyperlipidemia; Hypertension; Hypothyroidism; Osteoarthritis; and Skin disorder. has a past surgical history that includes laparoscopy (); Hysterectomy (); Bunionectomy (); Appendectomy (); Cardiac catheterization (); liver biopsy; Breast lumpectomy; and Coronary artery bypass graft (N/A, 2018). Restrictions  Restrictions/Precautions  Restrictions/Precautions: Fall Risk, Surgical Protocols, General Precautions  Required Braces or Orthoses?: Yes  Required Braces or Orthoses  Other: Heart Hugger Brace  Position Activity Restriction  Sternal Precautions: 5# Lifting Restrictions  Other position/activity restrictions:  Up with assist, O2, sternal precautions, heart hugger  Subjective   General  Chart Reviewed: Yes  Patient assessed for rehabilitation services?: Yes  Response to previous treatment: Patient with no complaints from previous session  Family / Caregiver Present: No      Orientation  Orientation  Overall Orientation Status: Within Functional Limits  Objective    ADL  Grooming: Stand by assistance; Moderate assistance (ModA with hair due to increased tangles)  Toileting: Stand by assistance        Balance  Sitting Balance: Stand by assistance  Standing Balance: Stand by assistance  Standing Balance  Sit to stand: Stand by assistance  Stand to sit: Stand by assistance  Comment: VC for use of heart hugger when standing from bed to RW with good carryover  Functional Mobility  Functional - Mobility Device: Rolling Walker  Activity: To/from bathroom; Other  Assist Level: Minimal assistance  Functional Mobility Comments: VC for pacing and rest breaks with good carryover. Pt able to better pace self this tx. Multiple standing rest breaks, SpO2>90% when completing functional mobility, HR ~125-130bpm  Toilet Transfers  Toilet - Technique: Ambulating  Equipment Used: Grab bars  Toilet Transfer: Stand by assistance  Bed mobility  Supine to Sit: Minimal assistance  Transfers  Sit to stand: Stand by assistance  Stand to sit: Stand by assistance                       Cognition  Overall Cognitive Status: Exceptions  Following Commands: Follows all commands without difficulty  Attention Span: Appears intact  Memory: Appears intact  Safety Judgement: Decreased awareness of need for safety;Decreased awareness of need for assistance  Problem Solving: Decreased awareness of errors;Assistance required to identify errors made;Assistance required to correct errors made  Insights: Decreased awareness of deficits  Sequencing: Requires cues for some     Perception  Overall Perceptual Status: [facility]        Additional Activities Comment  Additional Activities:      Upon writer exit, call light within reach, pt retired to chair. All lines intact and patient positioned comfortably. All patient needs addressed prior to ending therapy session.  Chart

## 2018-12-12 NOTE — FLOWSHEET NOTE
12/11/18 1900   Handoff   Communication Given Transfer Handoff  (Report to SNF [facility]) nurse)   Oncoming Nurse/Offgoing Nurse Blanca/Yazmin   Handoff Communication Telephone   Time Handoff Given 1910   Report called to [facility], Ric's nurse, Sarah Sat and informed [facility] faxed earlier and patient will have a packet for them on her arrival and also last Percocet given at 46.

## 2018-12-14 LAB
FIO2: ABNORMAL
GLUCOSE BLD-MCNC: 104 MG/DL (ref 74–106)
PATIENT TEMP: ABNORMAL
PATIENT TEMP: NORMAL
POC HEMATOCRIT: 34 % (ref 36–46)
POC HEMOGLOBIN: 11.6 G/DL (ref 12–16)
POC IONIZED CALCIUM: 1.22 MMOL/L (ref 1.13–1.33)
POC O2 SATURATION: 100 %
POC PH TEMP: NORMAL
POC PH: 7.36 (ref 7.35–7.45)
POC PO2 TEMP: ABNORMAL MM HG
POC PO2: 235 MM HG (ref 75–95)
POC POTASSIUM: 4.1 MMOL/L (ref 3.5–5.1)

## 2018-12-21 ENCOUNTER — OFFICE VISIT (OUTPATIENT)
Dept: CARDIOTHORACIC SURGERY | Age: 58
End: 2018-12-21

## 2018-12-21 ENCOUNTER — HOSPITAL ENCOUNTER (OUTPATIENT)
Age: 58
Discharge: HOME | End: 2018-12-23
Payer: COMMERCIAL

## 2018-12-21 ENCOUNTER — HOSPITAL ENCOUNTER (OUTPATIENT)
Dept: GENERAL RADIOLOGY | Age: 58
Discharge: HOME | End: 2018-12-23
Payer: COMMERCIAL

## 2018-12-21 VITALS
RESPIRATION RATE: 16 BRPM | TEMPERATURE: 98.4 F | SYSTOLIC BLOOD PRESSURE: 94 MMHG | HEIGHT: 63 IN | HEART RATE: 89 BPM | BODY MASS INDEX: 38.62 KG/M2 | OXYGEN SATURATION: 99 % | WEIGHT: 218 LBS | DIASTOLIC BLOOD PRESSURE: 52 MMHG

## 2018-12-21 DIAGNOSIS — Z95.1 S/P CABG (CORONARY ARTERY BYPASS GRAFT): Primary | ICD-10-CM

## 2018-12-21 DIAGNOSIS — Z95.1 S/P CABG X 2: Primary | ICD-10-CM

## 2018-12-21 DIAGNOSIS — Z95.1 S/P CABG X 2: ICD-10-CM

## 2018-12-21 PROCEDURE — 71046 X-RAY EXAM CHEST 2 VIEWS: CPT

## 2018-12-21 PROCEDURE — 99024 POSTOP FOLLOW-UP VISIT: CPT | Performed by: NURSE PRACTITIONER

## 2018-12-21 RX ORDER — FUROSEMIDE 20 MG/1
20 TABLET ORAL DAILY
COMMUNITY

## 2018-12-21 RX ORDER — ACETAMINOPHEN 500 MG
500 TABLET ORAL EVERY 6 HOURS PRN
COMMUNITY

## 2018-12-21 RX ORDER — HYDROCODONE BITARTRATE AND ACETAMINOPHEN 5; 325 MG/1; MG/1
1 TABLET ORAL 2 TIMES DAILY
COMMUNITY

## 2018-12-21 RX ORDER — POTASSIUM CHLORIDE 1.5 G/1.77G
20 POWDER, FOR SOLUTION ORAL DAILY
COMMUNITY
End: 2019-02-06

## 2019-01-07 ENCOUNTER — HOSPITAL ENCOUNTER (OUTPATIENT)
Dept: CARDIAC REHAB | Age: 59
Setting detail: THERAPIES SERIES
Discharge: HOME | End: 2019-01-07
Payer: COMMERCIAL

## 2019-01-07 VITALS
BODY MASS INDEX: 38.08 KG/M2 | HEIGHT: 63 IN | DIASTOLIC BLOOD PRESSURE: 64 MMHG | SYSTOLIC BLOOD PRESSURE: 132 MMHG | WEIGHT: 214.9 LBS

## 2019-01-07 PROCEDURE — 93798 PHYS/QHP OP CAR RHAB W/ECG: CPT

## 2019-01-07 ASSESSMENT — PATIENT HEALTH QUESTIONNAIRE - PHQ9
SUM OF ALL RESPONSES TO PHQ QUESTIONS 1-9: 1
SUM OF ALL RESPONSES TO PHQ QUESTIONS 1-9: 1

## 2019-01-09 ENCOUNTER — HOSPITAL ENCOUNTER (OUTPATIENT)
Dept: CARDIAC REHAB | Age: 59
Setting detail: THERAPIES SERIES
Discharge: HOME | End: 2019-01-09
Payer: COMMERCIAL

## 2019-01-09 VITALS — BODY MASS INDEX: 38.03 KG/M2 | WEIGHT: 214.7 LBS

## 2019-01-09 PROCEDURE — 93798 PHYS/QHP OP CAR RHAB W/ECG: CPT

## 2019-01-11 ENCOUNTER — HOSPITAL ENCOUNTER (OUTPATIENT)
Dept: CARDIAC REHAB | Age: 59
Setting detail: THERAPIES SERIES
Discharge: HOME | End: 2019-01-11
Payer: COMMERCIAL

## 2019-01-11 VITALS — BODY MASS INDEX: 38.1 KG/M2 | WEIGHT: 215.1 LBS

## 2019-01-11 PROCEDURE — 93798 PHYS/QHP OP CAR RHAB W/ECG: CPT

## 2019-01-14 ENCOUNTER — HOSPITAL ENCOUNTER (OUTPATIENT)
Dept: CARDIAC REHAB | Age: 59
Setting detail: THERAPIES SERIES
Discharge: HOME | End: 2019-01-14
Payer: COMMERCIAL

## 2019-01-14 VITALS — BODY MASS INDEX: 37.78 KG/M2 | WEIGHT: 213.3 LBS

## 2019-01-14 PROCEDURE — 93798 PHYS/QHP OP CAR RHAB W/ECG: CPT

## 2019-01-16 ENCOUNTER — HOSPITAL ENCOUNTER (OUTPATIENT)
Dept: CARDIAC REHAB | Age: 59
Setting detail: THERAPIES SERIES
Discharge: HOME | End: 2019-01-16
Payer: COMMERCIAL

## 2019-01-16 VITALS — BODY MASS INDEX: 37.64 KG/M2 | WEIGHT: 212.5 LBS

## 2019-01-16 PROCEDURE — 93798 PHYS/QHP OP CAR RHAB W/ECG: CPT

## 2019-01-18 ENCOUNTER — HOSPITAL ENCOUNTER (OUTPATIENT)
Dept: CARDIAC REHAB | Age: 59
Setting detail: THERAPIES SERIES
Discharge: HOME | End: 2019-01-18
Payer: COMMERCIAL

## 2019-01-18 VITALS — WEIGHT: 212.6 LBS | BODY MASS INDEX: 37.66 KG/M2

## 2019-01-18 PROCEDURE — 93798 PHYS/QHP OP CAR RHAB W/ECG: CPT

## 2019-01-21 ENCOUNTER — HOSPITAL ENCOUNTER (OUTPATIENT)
Dept: CARDIAC REHAB | Age: 59
Setting detail: THERAPIES SERIES
Discharge: HOME | End: 2019-01-21
Payer: COMMERCIAL

## 2019-01-21 VITALS — BODY MASS INDEX: 37.68 KG/M2 | WEIGHT: 212.7 LBS

## 2019-01-21 PROCEDURE — 93798 PHYS/QHP OP CAR RHAB W/ECG: CPT

## 2019-01-22 ASSESSMENT — ENCOUNTER SYMPTOMS
CHEST TIGHTNESS: 0
EYE DISCHARGE: 0
COUGH: 0
VOMITING: 0
NAUSEA: 0
CONSTIPATION: 0
SHORTNESS OF BREATH: 0
EYE REDNESS: 0
TROUBLE SWALLOWING: 0
ABDOMINAL PAIN: 0

## 2019-01-23 ENCOUNTER — HOSPITAL ENCOUNTER (OUTPATIENT)
Dept: CARDIAC REHAB | Age: 59
Setting detail: THERAPIES SERIES
Discharge: HOME | End: 2019-01-23
Payer: COMMERCIAL

## 2019-01-23 VITALS — BODY MASS INDEX: 37.84 KG/M2 | WEIGHT: 213.6 LBS

## 2019-01-23 PROCEDURE — 93798 PHYS/QHP OP CAR RHAB W/ECG: CPT

## 2019-01-25 ENCOUNTER — HOSPITAL ENCOUNTER (OUTPATIENT)
Dept: CARDIAC REHAB | Age: 59
Setting detail: THERAPIES SERIES
Discharge: HOME | End: 2019-01-25
Payer: COMMERCIAL

## 2019-01-25 VITALS — BODY MASS INDEX: 37.34 KG/M2 | WEIGHT: 210.8 LBS

## 2019-01-25 PROCEDURE — 93798 PHYS/QHP OP CAR RHAB W/ECG: CPT

## 2019-01-28 ENCOUNTER — HOSPITAL ENCOUNTER (OUTPATIENT)
Dept: CARDIAC REHAB | Age: 59
Setting detail: THERAPIES SERIES
Discharge: HOME | End: 2019-01-28
Payer: COMMERCIAL

## 2019-01-28 VITALS — WEIGHT: 213.6 LBS | BODY MASS INDEX: 37.84 KG/M2

## 2019-01-28 PROCEDURE — 93798 PHYS/QHP OP CAR RHAB W/ECG: CPT

## 2019-01-30 ENCOUNTER — HOSPITAL ENCOUNTER (OUTPATIENT)
Dept: CARDIAC REHAB | Age: 59
Setting detail: THERAPIES SERIES
End: 2019-01-30
Payer: COMMERCIAL

## 2019-02-01 ENCOUNTER — HOSPITAL ENCOUNTER (OUTPATIENT)
Dept: CARDIAC REHAB | Age: 59
Setting detail: THERAPIES SERIES
Discharge: HOME | End: 2019-02-01
Payer: COMMERCIAL

## 2019-02-01 VITALS — BODY MASS INDEX: 37.61 KG/M2 | WEIGHT: 212.3 LBS

## 2019-02-01 PROCEDURE — 93798 PHYS/QHP OP CAR RHAB W/ECG: CPT

## 2019-02-04 ENCOUNTER — HOSPITAL ENCOUNTER (OUTPATIENT)
Dept: CARDIAC REHAB | Age: 59
Setting detail: THERAPIES SERIES
Discharge: HOME | End: 2019-02-04
Payer: COMMERCIAL

## 2019-02-04 VITALS — BODY MASS INDEX: 37.62 KG/M2 | WEIGHT: 212.4 LBS

## 2019-02-04 PROCEDURE — 93798 PHYS/QHP OP CAR RHAB W/ECG: CPT

## 2019-02-06 ENCOUNTER — HOSPITAL ENCOUNTER (OUTPATIENT)
Dept: CARDIAC REHAB | Age: 59
Setting detail: THERAPIES SERIES
Discharge: HOME | End: 2019-02-06
Payer: COMMERCIAL

## 2019-02-06 VITALS — HEIGHT: 63 IN | WEIGHT: 214.9 LBS | BODY MASS INDEX: 38.08 KG/M2

## 2019-02-06 PROCEDURE — 93798 PHYS/QHP OP CAR RHAB W/ECG: CPT

## 2019-02-06 RX ORDER — POTASSIUM CHLORIDE 1.5 G/1.77G
20 POWDER, FOR SOLUTION ORAL DAILY
COMMUNITY

## 2019-02-08 ENCOUNTER — HOSPITAL ENCOUNTER (OUTPATIENT)
Dept: CARDIAC REHAB | Age: 59
Setting detail: THERAPIES SERIES
Discharge: HOME | End: 2019-02-08
Payer: COMMERCIAL

## 2019-02-08 VITALS — BODY MASS INDEX: 38.24 KG/M2 | WEIGHT: 215.9 LBS

## 2019-02-08 PROCEDURE — 93798 PHYS/QHP OP CAR RHAB W/ECG: CPT

## 2019-02-11 ENCOUNTER — HOSPITAL ENCOUNTER (OUTPATIENT)
Dept: CARDIAC REHAB | Age: 59
Setting detail: THERAPIES SERIES
Discharge: HOME | End: 2019-02-11
Payer: COMMERCIAL

## 2019-02-11 VITALS — BODY MASS INDEX: 38.62 KG/M2 | WEIGHT: 218 LBS

## 2019-02-11 PROCEDURE — 93798 PHYS/QHP OP CAR RHAB W/ECG: CPT

## 2019-02-13 ENCOUNTER — APPOINTMENT (OUTPATIENT)
Dept: CARDIAC REHAB | Age: 59
End: 2019-02-13
Payer: COMMERCIAL

## 2019-02-15 ENCOUNTER — HOSPITAL ENCOUNTER (OUTPATIENT)
Dept: CARDIAC REHAB | Age: 59
Setting detail: THERAPIES SERIES
Discharge: HOME | End: 2019-02-15
Payer: COMMERCIAL

## 2019-02-15 VITALS — WEIGHT: 216.1 LBS | BODY MASS INDEX: 38.28 KG/M2

## 2019-02-15 PROCEDURE — 93798 PHYS/QHP OP CAR RHAB W/ECG: CPT

## 2019-02-18 ENCOUNTER — HOSPITAL ENCOUNTER (OUTPATIENT)
Dept: CARDIAC REHAB | Age: 59
Setting detail: THERAPIES SERIES
Discharge: HOME | End: 2019-02-18
Payer: COMMERCIAL

## 2019-02-18 VITALS — WEIGHT: 216.3 LBS | BODY MASS INDEX: 38.32 KG/M2

## 2019-02-18 PROCEDURE — 93798 PHYS/QHP OP CAR RHAB W/ECG: CPT

## 2019-02-20 ENCOUNTER — HOSPITAL ENCOUNTER (OUTPATIENT)
Dept: CARDIAC REHAB | Age: 59
Setting detail: THERAPIES SERIES
Discharge: HOME | End: 2019-02-20
Payer: COMMERCIAL

## 2019-02-20 VITALS — BODY MASS INDEX: 38.42 KG/M2 | WEIGHT: 216.9 LBS

## 2019-02-20 PROCEDURE — 93798 PHYS/QHP OP CAR RHAB W/ECG: CPT

## 2019-02-22 ENCOUNTER — HOSPITAL ENCOUNTER (OUTPATIENT)
Dept: CARDIAC REHAB | Age: 59
Setting detail: THERAPIES SERIES
Discharge: HOME | End: 2019-02-22
Payer: COMMERCIAL

## 2019-02-22 VITALS — WEIGHT: 215.5 LBS | BODY MASS INDEX: 38.17 KG/M2

## 2019-02-22 PROCEDURE — 93798 PHYS/QHP OP CAR RHAB W/ECG: CPT

## 2019-02-25 ENCOUNTER — HOSPITAL ENCOUNTER (OUTPATIENT)
Dept: CARDIAC REHAB | Age: 59
Setting detail: THERAPIES SERIES
Discharge: HOME | End: 2019-02-25
Payer: COMMERCIAL

## 2019-02-25 VITALS — BODY MASS INDEX: 38.28 KG/M2 | WEIGHT: 216.1 LBS

## 2019-02-25 PROCEDURE — 93798 PHYS/QHP OP CAR RHAB W/ECG: CPT

## 2019-02-27 ENCOUNTER — HOSPITAL ENCOUNTER (OUTPATIENT)
Dept: CARDIAC REHAB | Age: 59
Setting detail: THERAPIES SERIES
Discharge: HOME | End: 2019-02-27
Payer: COMMERCIAL

## 2019-02-27 VITALS — WEIGHT: 214.5 LBS | BODY MASS INDEX: 38 KG/M2

## 2019-02-27 PROCEDURE — 93798 PHYS/QHP OP CAR RHAB W/ECG: CPT

## 2019-03-01 ENCOUNTER — HOSPITAL ENCOUNTER (OUTPATIENT)
Dept: CARDIAC REHAB | Age: 59
Setting detail: THERAPIES SERIES
Discharge: HOME | End: 2019-03-01
Payer: COMMERCIAL

## 2019-03-01 VITALS — BODY MASS INDEX: 38.1 KG/M2 | WEIGHT: 215.1 LBS

## 2019-03-01 PROCEDURE — 93798 PHYS/QHP OP CAR RHAB W/ECG: CPT

## 2019-03-04 ENCOUNTER — HOSPITAL ENCOUNTER (OUTPATIENT)
Dept: CARDIAC REHAB | Age: 59
Setting detail: THERAPIES SERIES
Discharge: HOME | End: 2019-03-04
Payer: COMMERCIAL

## 2019-03-04 VITALS — BODY MASS INDEX: 38.03 KG/M2 | WEIGHT: 214.7 LBS

## 2019-03-04 PROCEDURE — 93798 PHYS/QHP OP CAR RHAB W/ECG: CPT

## 2019-03-06 ENCOUNTER — HOSPITAL ENCOUNTER (OUTPATIENT)
Dept: CARDIAC REHAB | Age: 59
Setting detail: THERAPIES SERIES
Discharge: HOME | End: 2019-03-06
Payer: COMMERCIAL

## 2019-03-06 VITALS — BODY MASS INDEX: 38.29 KG/M2 | HEIGHT: 63 IN | WEIGHT: 216.1 LBS

## 2019-03-06 PROCEDURE — 93798 PHYS/QHP OP CAR RHAB W/ECG: CPT

## 2019-03-08 ENCOUNTER — HOSPITAL ENCOUNTER (OUTPATIENT)
Dept: CARDIAC REHAB | Age: 59
Setting detail: THERAPIES SERIES
Discharge: HOME | End: 2019-03-08
Payer: COMMERCIAL

## 2019-03-08 VITALS — WEIGHT: 216 LBS | BODY MASS INDEX: 38.26 KG/M2

## 2019-03-08 PROCEDURE — 93798 PHYS/QHP OP CAR RHAB W/ECG: CPT

## 2019-03-11 ENCOUNTER — HOSPITAL ENCOUNTER (OUTPATIENT)
Dept: CARDIAC REHAB | Age: 59
Setting detail: THERAPIES SERIES
Discharge: HOME | End: 2019-03-11
Payer: COMMERCIAL

## 2019-03-11 VITALS — BODY MASS INDEX: 38.23 KG/M2 | WEIGHT: 215.8 LBS

## 2019-03-11 PROCEDURE — 93798 PHYS/QHP OP CAR RHAB W/ECG: CPT

## 2019-03-13 ENCOUNTER — HOSPITAL ENCOUNTER (OUTPATIENT)
Dept: CARDIAC REHAB | Age: 59
Setting detail: THERAPIES SERIES
Discharge: HOME | End: 2019-03-13
Payer: COMMERCIAL

## 2019-03-15 ENCOUNTER — HOSPITAL ENCOUNTER (OUTPATIENT)
Dept: CARDIAC REHAB | Age: 59
Setting detail: THERAPIES SERIES
Discharge: HOME | End: 2019-03-15
Payer: COMMERCIAL

## 2019-03-15 VITALS — WEIGHT: 216.3 LBS | BODY MASS INDEX: 38.32 KG/M2

## 2019-03-15 PROCEDURE — 93798 PHYS/QHP OP CAR RHAB W/ECG: CPT

## 2019-03-18 ENCOUNTER — HOSPITAL ENCOUNTER (OUTPATIENT)
Dept: CARDIAC REHAB | Age: 59
Setting detail: THERAPIES SERIES
Discharge: HOME | End: 2019-03-18
Payer: COMMERCIAL

## 2019-03-18 VITALS — BODY MASS INDEX: 38.21 KG/M2 | WEIGHT: 215.7 LBS

## 2019-03-18 PROCEDURE — 93798 PHYS/QHP OP CAR RHAB W/ECG: CPT

## 2019-03-20 ENCOUNTER — HOSPITAL ENCOUNTER (OUTPATIENT)
Dept: CARDIAC REHAB | Age: 59
Setting detail: THERAPIES SERIES
Discharge: HOME | End: 2019-03-20
Payer: COMMERCIAL

## 2019-03-20 VITALS — WEIGHT: 217.1 LBS | BODY MASS INDEX: 38.46 KG/M2

## 2019-03-20 PROCEDURE — 93798 PHYS/QHP OP CAR RHAB W/ECG: CPT

## 2019-03-22 ENCOUNTER — HOSPITAL ENCOUNTER (OUTPATIENT)
Dept: CARDIAC REHAB | Age: 59
Setting detail: THERAPIES SERIES
Discharge: HOME | End: 2019-03-22
Payer: COMMERCIAL

## 2019-03-22 VITALS — BODY MASS INDEX: 38.39 KG/M2 | WEIGHT: 216.7 LBS

## 2019-03-22 PROCEDURE — 93798 PHYS/QHP OP CAR RHAB W/ECG: CPT

## 2019-03-25 ENCOUNTER — HOSPITAL ENCOUNTER (OUTPATIENT)
Dept: CARDIAC REHAB | Age: 59
Setting detail: THERAPIES SERIES
Discharge: HOME | End: 2019-03-25
Payer: COMMERCIAL

## 2019-03-27 ENCOUNTER — HOSPITAL ENCOUNTER (OUTPATIENT)
Dept: CARDIAC REHAB | Age: 59
Setting detail: THERAPIES SERIES
Discharge: HOME | End: 2019-03-27
Payer: COMMERCIAL

## 2019-03-27 VITALS — WEIGHT: 216.8 LBS | BODY MASS INDEX: 38.4 KG/M2

## 2019-03-27 PROCEDURE — 93798 PHYS/QHP OP CAR RHAB W/ECG: CPT

## 2019-03-29 ENCOUNTER — HOSPITAL ENCOUNTER (OUTPATIENT)
Dept: CARDIAC REHAB | Age: 59
Setting detail: THERAPIES SERIES
Discharge: HOME | End: 2019-03-29
Payer: COMMERCIAL

## 2019-03-29 VITALS — WEIGHT: 220 LBS | BODY MASS INDEX: 38.97 KG/M2

## 2019-03-29 PROCEDURE — 93798 PHYS/QHP OP CAR RHAB W/ECG: CPT

## 2019-04-01 ENCOUNTER — HOSPITAL ENCOUNTER (OUTPATIENT)
Dept: CARDIAC REHAB | Age: 59
Setting detail: THERAPIES SERIES
Discharge: HOME | End: 2019-04-01
Payer: COMMERCIAL

## 2019-04-01 VITALS — BODY MASS INDEX: 38.17 KG/M2 | WEIGHT: 215.5 LBS

## 2019-04-01 PROCEDURE — 93798 PHYS/QHP OP CAR RHAB W/ECG: CPT

## 2019-04-03 ENCOUNTER — HOSPITAL ENCOUNTER (OUTPATIENT)
Dept: CARDIAC REHAB | Age: 59
Setting detail: THERAPIES SERIES
Discharge: HOME | End: 2019-04-03
Payer: COMMERCIAL

## 2019-04-03 VITALS — BODY MASS INDEX: 38.37 KG/M2 | WEIGHT: 216.6 LBS

## 2019-04-03 PROCEDURE — 93798 PHYS/QHP OP CAR RHAB W/ECG: CPT

## 2019-04-05 ENCOUNTER — HOSPITAL ENCOUNTER (OUTPATIENT)
Dept: CARDIAC REHAB | Age: 59
Setting detail: THERAPIES SERIES
Discharge: HOME | End: 2019-04-05
Payer: COMMERCIAL

## 2019-04-05 VITALS — WEIGHT: 216.1 LBS | BODY MASS INDEX: 38.28 KG/M2

## 2019-04-05 PROCEDURE — 93798 PHYS/QHP OP CAR RHAB W/ECG: CPT

## 2019-04-08 ENCOUNTER — HOSPITAL ENCOUNTER (OUTPATIENT)
Dept: CARDIAC REHAB | Age: 59
Setting detail: THERAPIES SERIES
Discharge: HOME | End: 2019-04-08
Payer: COMMERCIAL

## 2019-04-08 VITALS — HEIGHT: 63 IN | WEIGHT: 215.1 LBS | BODY MASS INDEX: 38.11 KG/M2

## 2019-04-08 PROCEDURE — 93798 PHYS/QHP OP CAR RHAB W/ECG: CPT

## 2019-04-08 ASSESSMENT — PATIENT HEALTH QUESTIONNAIRE - PHQ9
SUM OF ALL RESPONSES TO PHQ QUESTIONS 1-9: 1
SUM OF ALL RESPONSES TO PHQ QUESTIONS 1-9: 1

## 2019-04-08 NOTE — PROGRESS NOTES
Pt completed 36/36 Cardiac Rehab sessions. Pt did not attend nutrition program but plans to attend Phase 3 on Tuesdays.

## 2019-04-16 ENCOUNTER — HOSPITAL ENCOUNTER (OUTPATIENT)
Age: 59
Discharge: HOME | End: 2019-04-16

## 2019-04-16 PROCEDURE — 9900000065 HC CARDIAC REHAB PHASE 3 - 1 VISIT

## 2019-04-23 ENCOUNTER — HOSPITAL ENCOUNTER (OUTPATIENT)
Age: 59
Discharge: HOME | End: 2019-04-23

## 2019-04-23 PROCEDURE — 9900000065 HC CARDIAC REHAB PHASE 3 - 1 VISIT

## 2019-05-31 ENCOUNTER — APPOINTMENT (OUTPATIENT)
Dept: CARDIAC REHAB | Age: 59
End: 2019-05-31
Payer: COMMERCIAL

## 2023-10-28 NOTE — FLOWSHEET NOTE
Luna, unit manager completed OHS class with the patient and patient's brother and significant other at the bedside. Patient verbalizes understanding. The CT of the head had the following finding:    Mildly hyperdense mass along the posterior aspect of the right thalamus. This can be further evaluated with MRI preferably without and with intravenous gadolinium. You can follow up with the primary care doctor to have this scheduled as an outpatient.

## (undated) DEVICE — 3M™ TEGADERM™ CHG DRESSING 25/CARTON 4 CARTONS/CASE 1659: Brand: TEGADERM™

## (undated) DEVICE — Z DISCONTINUED NO SUB IDED DRAIN SURG 2 COLL PT TB FOR ATS BG OASIS

## (undated) DEVICE — TRAY CATH 16FR COMPLT CARE URIN M TEMP SENS STATLOK STBL

## (undated) DEVICE — CATHETER THOR 28FR SIL 6 EYE STR HI TEAR STRENGTH

## (undated) DEVICE — SENSOR DISP ENTROPY EZ FIT

## (undated) DEVICE — DRESSING TRNSPAR W4XL10IN FLM MIC POR SURESITE 123

## (undated) DEVICE — SUTURE PROL SZ 4-0 L36IN NONABSORBABLE BLU L26MM SH 1/2 CIR 8521H

## (undated) DEVICE — SUTURE ETHIB EXCL BR GRN TAPR PT 2-0 30 X563H X563H

## (undated) DEVICE — SKIN AFFIX SURG ADHESIVE 72/CS 0.55ML: Brand: MEDLINE

## (undated) DEVICE — SS SUTURE, 3 PER SLEEVE: Brand: MYO/WIRE II

## (undated) DEVICE — CATHETER THOR L21IN DIA28FR R ANG SFT RADPQ STRP SIL

## (undated) DEVICE — SUTURE MCRYL SZ 4-0 L27IN ABSRB UD L19MM PS-2 1/2 CIR PRIM Y426H

## (undated) DEVICE — COVER LT HNDL BLU PLAS

## (undated) DEVICE — SYRINGE MED 50ML LUERLOCK TIP

## (undated) DEVICE — TOWEL,OR,DSP,ST,BLUE,STD,4/PK,20PK/CS: Brand: MEDLINE

## (undated) DEVICE — ELECTROSURGICAL PENCIL BUTTON SWITCH E-Z CLEAN COATED BLADE ELECTRODE 10 FT (3 M) CORD HOLSTER: Brand: MEGADYNE

## (undated) DEVICE — SET EXTN L41IN 2 NDL FREE VALVES FREE INJ PRT

## (undated) DEVICE — Device: Brand: Q2 MULTIPORT EXTENSION SET

## (undated) DEVICE — TRANSDUCER KT INVASIVE BLD PRSS SGL LN 1 TRNSDUC

## (undated) DEVICE — BLOOD TRANSFUSION FILTER: Brand: HAEMONETICS

## (undated) DEVICE — YANKAUER,FLEXIBLE HANDLE,REGLR CAPACITY: Brand: MEDLINE INDUSTRIES, INC.

## (undated) DEVICE — SWAN-GANZ CCOMBO THERMODILUTION CATHETER: Brand: SWAN-GANZ CCOMBO

## (undated) DEVICE — U.V.A.C. SWIVEL HANDLE W/TUBING, 6': Brand: CONVERTORS

## (undated) DEVICE — Z INACTIVE USE 2540311 LEAD PACE L475MM CHN A OR V MYOCARDIAL STEROID ELUT SIL

## (undated) DEVICE — SOLUTION IV 1000ML 0.9% SOD CHL PH 5 INJ USP VIAFLX PLAS

## (undated) DEVICE — CATHETER,URETHRAL,REDRUBBER,STRL,18FR: Brand: MEDLINE

## (undated) DEVICE — CANISTER, RIGID, 1200CC: Brand: MEDLINE INDUSTRIES, INC.

## (undated) DEVICE — TTL1LYR 16FR10ML 100%SIL TMPST TR: Brand: MEDLINE

## (undated) DEVICE — GLOVE SURG SZ 7 L12IN FNGR THK87MIL WHT LTX FREE

## (undated) DEVICE — Device: Brand: PERFECTCUT AORTOTOMY SYSTEM

## (undated) DEVICE — CONNECTOR TBNG Y 6IN 1 PLAS LTWT

## (undated) DEVICE — CANNULA PERFUSION 5.5IN 9FR AORTIC ROOT

## (undated) DEVICE — PRESSURE MONITORING SET: Brand: TRUWAVE

## (undated) DEVICE — SET CATH 20GA L1.75IN RAD ART POLYUR RADPQ W/ INTEGR

## (undated) DEVICE — 3M™ BAIR HUGGER® UNDERBODY BLANKET, FULL ACCESS, 10 PER CASE 63500: Brand: BAIR HUGGER™

## (undated) DEVICE — 9165 UNIVERSAL PATIENT PLATE: Brand: 3M™

## (undated) DEVICE — SYRINGE, LUER LOCK, 10ML: Brand: MEDLINE

## (undated) DEVICE — BLADE SAW W6.35XL32MM STRNM CUT STRNOTMY

## (undated) DEVICE — SET ADMIN L114IN 13ML 60GTT AD 2 PRT NDLLSS VLV PRT

## (undated) DEVICE — BLADE OPHTH ORNG GRINDLESS SMALLER ALTERNATIVE TO NO15 GEN

## (undated) DEVICE — CANISTER, RIGID, 3000CC: Brand: MEDLINE INDUSTRIES, INC.

## (undated) DEVICE — SUTURE VCRL SZ 4-0 L27IN ABSRB UD L19MM PS-2 3/8 CIR PRIM J426H

## (undated) DEVICE — DRAPE,CVMAX,CARDIOVASCULAR: Brand: MEDLINE

## (undated) DEVICE — PAD,NON-ADHERENT,3X8,STERILE,LF,1/PK: Brand: MEDLINE

## (undated) DEVICE — GOWN,AURORA,NONRNF,XL,30/CS: Brand: MEDLINE

## (undated) DEVICE — PACK PROCEDURE SURG OPN HRT

## (undated) DEVICE — INSUFFLATION TUBING SET WITH FILTER, FUNNEL CONNECTOR AND LUER LOCK: Brand: JOSNOE MEDICAL INC

## (undated) DEVICE — 3M™ RANGER™ BLOOD/FLUID WARMING STANDARD FLOW SET, 24200, 10/CASE: Brand: 3M™ RANGER™

## (undated) DEVICE — SSC BONE WAX: Brand: SSC BONE WAX

## (undated) DEVICE — GLOVE SURG SZ 75 L12IN FNGR THK79MIL GRN LTX FREE

## (undated) DEVICE — GOWN,SIRUS,NON REINFRCD,LARGE,SET IN SL: Brand: MEDLINE

## (undated) DEVICE — SET ADMIN PRIMING 67ML L105IN NVENT 180UM FLTR 3 RLER CLMP

## (undated) DEVICE — DRAPE SLUSH DISC W44XL66IN ST FOR RND BSIN HUSH SLUSH SYS

## (undated) DEVICE — SUTURE PROL SZ 7-0 L24IN NONABSORBABLE BLU L8MM BV175-6 3/8 8735H

## (undated) DEVICE — GAUZE,SPONGE,4"X4",16PLY,XRAY,STRL,LF: Brand: MEDLINE

## (undated) DEVICE — SUTURE PERMA-HAND SZ 0 L18IN NONABSORBABLE BLK CT-2 L26MM C027D

## (undated) DEVICE — SOLUTION IV 100ML 0.9% SOD CHL PLAS CONT USP VIAFLX 1 PER

## (undated) DEVICE — BLADE OPHTH D5MM 15DEG GRN W/ RND KNURLED HNDL MICRO-SHARP

## (undated) DEVICE — Device

## (undated) DEVICE — PACK PROCEDURE SURG OPN HRT ADD ON

## (undated) DEVICE — CHLORAPREP 26ML ORANGE

## (undated) DEVICE — GLOVE SURG SZ 75 L12IN FNGR THK87MIL WHT LTX FREE

## (undated) DEVICE — SKIN PREP TRAY W/CHG: Brand: MEDLINE INDUSTRIES, INC.

## (undated) DEVICE — AGENT HEMSTAT W2XL14IN OXIDIZED REGENERATED CELOS ABSRB FOR

## (undated) DEVICE — TUBING, SUCTION, 1/4" X 12', STRAIGHT: Brand: MEDLINE

## (undated) DEVICE — SUTURE PROL SZ 6-0 L18IN NONABSORBABLE BLU RB-2 L13MM 1/2 8714H

## (undated) DEVICE — CANNULA PERF L2IN BLNT TIP 2MM VES CLR RADPQ BODY FEM LUER

## (undated) DEVICE — KIT INTRO 9FR L4IN POLYUR PERC SHTH RADPQ W INTEGR HEMSTAS

## (undated) DEVICE — SENSOR OXMTR SM AD DISP FOR INVOS SYS

## (undated) DEVICE — SOLUTION IV 500ML 0.9% SOD CHL PH 5 INJ USP VIAFLX PLAS

## (undated) DEVICE — PROBE VASC STD 1-1.5 MM 45 CM

## (undated) DEVICE — Z DISCONTINUED APPLICATOR SURG PREP 0.35OZ 2% CHG 70% ISO ALC W/ HI LT

## (undated) DEVICE — SUTURE PDS II SZ 0 L36IN ABSRB VLT L36MM CT-1 1/2 CIR Z346H

## (undated) DEVICE — SET ADMIN 25ML L117IN PMP MOD CK VLV RLER CLMP 2 SMRTSITE

## (undated) DEVICE — Device: Brand: VIRTUOSAPH PLUS WITH RADIAL INDICATION